# Patient Record
Sex: FEMALE | Race: WHITE | HISPANIC OR LATINO
[De-identification: names, ages, dates, MRNs, and addresses within clinical notes are randomized per-mention and may not be internally consistent; named-entity substitution may affect disease eponyms.]

---

## 2017-08-09 ENCOUNTER — TRANSCRIPTION ENCOUNTER (OUTPATIENT)
Age: 62
End: 2017-08-09

## 2018-11-29 ENCOUNTER — RESULT REVIEW (OUTPATIENT)
Age: 63
End: 2018-11-29

## 2021-01-08 ENCOUNTER — EMERGENCY (EMERGENCY)
Facility: HOSPITAL | Age: 66
LOS: 1 days | Discharge: HOME | End: 2021-01-08
Attending: EMERGENCY MEDICINE
Payer: COMMERCIAL

## 2021-01-08 VITALS
HEIGHT: 65 IN | TEMPERATURE: 98 F | SYSTOLIC BLOOD PRESSURE: 160 MMHG | RESPIRATION RATE: 18 BRPM | DIASTOLIC BLOOD PRESSURE: 92 MMHG | HEART RATE: 77 BPM | OXYGEN SATURATION: 97 % | WEIGHT: 184.97 LBS

## 2021-01-08 LAB
ALBUMIN SERPL ELPH-MCNC: 4.3 G/DL — SIGNIFICANT CHANGE UP (ref 3.3–5.2)
ALP SERPL-CCNC: 95 U/L — SIGNIFICANT CHANGE UP (ref 40–120)
ALT FLD-CCNC: 28 U/L — SIGNIFICANT CHANGE UP
ANION GAP SERPL CALC-SCNC: 11 MMOL/L — SIGNIFICANT CHANGE UP (ref 5–17)
APPEARANCE UR: CLEAR — SIGNIFICANT CHANGE UP
AST SERPL-CCNC: 24 U/L — SIGNIFICANT CHANGE UP
BACTERIA # UR AUTO: NEGATIVE — SIGNIFICANT CHANGE UP
BASOPHILS # BLD AUTO: 0.04 K/UL — SIGNIFICANT CHANGE UP (ref 0–0.2)
BASOPHILS NFR BLD AUTO: 0.6 % — SIGNIFICANT CHANGE UP (ref 0–2)
BILIRUB SERPL-MCNC: 0.5 MG/DL — SIGNIFICANT CHANGE UP (ref 0.4–2)
BILIRUB UR-MCNC: ABNORMAL
BUN SERPL-MCNC: 19 MG/DL — SIGNIFICANT CHANGE UP (ref 8–20)
CALCIUM SERPL-MCNC: 9.8 MG/DL — SIGNIFICANT CHANGE UP (ref 8.6–10.2)
CHLORIDE SERPL-SCNC: 100 MMOL/L — SIGNIFICANT CHANGE UP (ref 98–107)
CO2 SERPL-SCNC: 25 MMOL/L — SIGNIFICANT CHANGE UP (ref 22–29)
COLOR SPEC: YELLOW — SIGNIFICANT CHANGE UP
CREAT SERPL-MCNC: 0.6 MG/DL — SIGNIFICANT CHANGE UP (ref 0.5–1.3)
DIFF PNL FLD: ABNORMAL
EOSINOPHIL # BLD AUTO: 0.08 K/UL — SIGNIFICANT CHANGE UP (ref 0–0.5)
EOSINOPHIL NFR BLD AUTO: 1.3 % — SIGNIFICANT CHANGE UP (ref 0–6)
EPI CELLS # UR: SIGNIFICANT CHANGE UP
GLUCOSE SERPL-MCNC: 105 MG/DL — HIGH (ref 70–99)
GLUCOSE UR QL: NEGATIVE MG/DL — SIGNIFICANT CHANGE UP
HCT VFR BLD CALC: 43.9 % — SIGNIFICANT CHANGE UP (ref 34.5–45)
HGB BLD-MCNC: 14.2 G/DL — SIGNIFICANT CHANGE UP (ref 11.5–15.5)
IMM GRANULOCYTES NFR BLD AUTO: 0.2 % — SIGNIFICANT CHANGE UP (ref 0–1.5)
KETONES UR-MCNC: ABNORMAL
LEUKOCYTE ESTERASE UR-ACNC: ABNORMAL
LYMPHOCYTES # BLD AUTO: 1.99 K/UL — SIGNIFICANT CHANGE UP (ref 1–3.3)
LYMPHOCYTES # BLD AUTO: 31.6 % — SIGNIFICANT CHANGE UP (ref 13–44)
MCHC RBC-ENTMCNC: 29.3 PG — SIGNIFICANT CHANGE UP (ref 27–34)
MCHC RBC-ENTMCNC: 32.3 GM/DL — SIGNIFICANT CHANGE UP (ref 32–36)
MCV RBC AUTO: 90.5 FL — SIGNIFICANT CHANGE UP (ref 80–100)
MONOCYTES # BLD AUTO: 0.31 K/UL — SIGNIFICANT CHANGE UP (ref 0–0.9)
MONOCYTES NFR BLD AUTO: 4.9 % — SIGNIFICANT CHANGE UP (ref 2–14)
NEUTROPHILS # BLD AUTO: 3.86 K/UL — SIGNIFICANT CHANGE UP (ref 1.8–7.4)
NEUTROPHILS NFR BLD AUTO: 61.4 % — SIGNIFICANT CHANGE UP (ref 43–77)
NITRITE UR-MCNC: NEGATIVE — SIGNIFICANT CHANGE UP
PH UR: 5 — SIGNIFICANT CHANGE UP (ref 5–8)
PLATELET # BLD AUTO: 263 K/UL — SIGNIFICANT CHANGE UP (ref 150–400)
POTASSIUM SERPL-MCNC: 4.1 MMOL/L — SIGNIFICANT CHANGE UP (ref 3.5–5.3)
POTASSIUM SERPL-SCNC: 4.1 MMOL/L — SIGNIFICANT CHANGE UP (ref 3.5–5.3)
PROT SERPL-MCNC: 7.3 G/DL — SIGNIFICANT CHANGE UP (ref 6.6–8.7)
PROT UR-MCNC: 30 MG/DL
RBC # BLD: 4.85 M/UL — SIGNIFICANT CHANGE UP (ref 3.8–5.2)
RBC # FLD: 12.7 % — SIGNIFICANT CHANGE UP (ref 10.3–14.5)
RBC CASTS # UR COMP ASSIST: NEGATIVE /HPF — SIGNIFICANT CHANGE UP (ref 0–4)
SARS-COV-2 RNA SPEC QL NAA+PROBE: SIGNIFICANT CHANGE UP
SODIUM SERPL-SCNC: 136 MMOL/L — SIGNIFICANT CHANGE UP (ref 135–145)
SP GR SPEC: 1.02 — SIGNIFICANT CHANGE UP (ref 1.01–1.02)
TROPONIN T SERPL-MCNC: <0.01 NG/ML — SIGNIFICANT CHANGE UP (ref 0–0.06)
UROBILINOGEN FLD QL: 1 MG/DL
WBC # BLD: 6.29 K/UL — SIGNIFICANT CHANGE UP (ref 3.8–10.5)
WBC # FLD AUTO: 6.29 K/UL — SIGNIFICANT CHANGE UP (ref 3.8–10.5)
WBC UR QL: SIGNIFICANT CHANGE UP

## 2021-01-08 PROCEDURE — 70450 CT HEAD/BRAIN W/O DYE: CPT | Mod: 26

## 2021-01-08 PROCEDURE — 99284 EMERGENCY DEPT VISIT MOD MDM: CPT | Mod: 25

## 2021-01-08 PROCEDURE — 36415 COLL VENOUS BLD VENIPUNCTURE: CPT

## 2021-01-08 PROCEDURE — 71046 X-RAY EXAM CHEST 2 VIEWS: CPT | Mod: 26

## 2021-01-08 PROCEDURE — 84484 ASSAY OF TROPONIN QUANT: CPT

## 2021-01-08 PROCEDURE — 93010 ELECTROCARDIOGRAM REPORT: CPT

## 2021-01-08 PROCEDURE — 81001 URINALYSIS AUTO W/SCOPE: CPT

## 2021-01-08 PROCEDURE — 70450 CT HEAD/BRAIN W/O DYE: CPT

## 2021-01-08 PROCEDURE — U0003: CPT

## 2021-01-08 PROCEDURE — 71046 X-RAY EXAM CHEST 2 VIEWS: CPT

## 2021-01-08 PROCEDURE — 80053 COMPREHEN METABOLIC PANEL: CPT

## 2021-01-08 PROCEDURE — 93005 ELECTROCARDIOGRAM TRACING: CPT

## 2021-01-08 PROCEDURE — U0005: CPT

## 2021-01-08 PROCEDURE — 99284 EMERGENCY DEPT VISIT MOD MDM: CPT

## 2021-01-08 PROCEDURE — 85025 COMPLETE CBC W/AUTO DIFF WBC: CPT

## 2021-01-08 RX ORDER — CHLORTHALIDONE 50 MG
1 TABLET ORAL
Qty: 30 | Refills: 0
Start: 2021-01-08 | End: 2021-02-06

## 2021-01-08 RX ORDER — POTASSIUM CHLORIDE 20 MEQ
1 PACKET (EA) ORAL
Qty: 60 | Refills: 0
Start: 2021-01-08 | End: 2021-02-06

## 2021-01-08 NOTE — ED ADULT NURSE REASSESSMENT NOTE - NS ED NURSE REASSESS COMMENT FT1
IV removed, discharge instructions provided by MD. Vital Signs Stable. pt d/c in stable condition, no apparent distress noted at this time. pt A&Ox3. pt able to ambulate with steady gait. pt in no distress at d/c.

## 2021-01-08 NOTE — CONSULT NOTE ADULT - SUBJECTIVE AND OBJECTIVE BOX
[place] [facility]                                                               Office:  [address] [address]                                                              Telephone: [phone]. Fax:[phone]                                                                        CARDIOLOGY CONSULTATION NOTE                                                                                             Consult requested by:  EFRAIN Beltran  Reason for Consultation: Hypertension, Dizziness  History obtained by: Patient and medical record   obtained: No    Chief complaint:    Patient is a 65y old  Female who presents with a chief complaint of headache, dizziness, HTN    HPI:  66yo female w/PMH HTN, managed by PMD Dr. Simi Arellano.  Normally controlled on Benazapril 20mg BID and Coreg 3.125mg PO BID. For the last 2 days she has been having headaches and dizziness with -125.  Admits to having a poor diet and not controlling sodium intake. Denies chest pain/pressure, palpitations, irregular and/or rapid heart beat, SOB, QUINTEROS, syncope/near syncope, orthopnea, PND, cough, edema, f/c, n/v/d, hematuria, or hematochezia.     REVIEW OF SYMPTOMS:     CONSTITUTIONAL: No fever, weight loss, or fatigue  ENMT:  No difficulty hearing, tinnitus, vertigo; No sinus or throat pain  NECK: No pain or stiffness  CARDIOVASCULAR: as per HPI  RESPIRATORY: No Dyspnea on exertion, Shortness of breath, cough, wheezing  : No dysuria, no hematuria   GI: No dark color stool, no melena, no diarrhea, no constipation, no abdominal pain   NEURO: as per HPI   MUSCULOSKELETAL: No joint pain or swelling; No muscle, back, or extremity pain  PSYCH: No agitation, no anxiety.    ALL OTHER REVIEW OF SYSTEMS ARE NEGATIVE.      PREVIOUS DIAGNOSTIC TESTING  none documented      ALLERGIES: Allergies    No Known Allergies    Intolerances          PAST MEDICAL HISTORY  Borderline diabetic    Hyperlipidemia, unspecified hyperlipidemia type    Hypertension, unspecified type    No pertinent past medical history        PAST SURGICAL HISTORY  No significant past surgical history        FAMILY HISTORY:  Mom CABG x3 in 60s    SOCIAL HISTORY:    CIGARETTES: Denies  ALCOHOL: Denies  DRUGS: Denies      CURRENT MEDICATIONS:           HOME MEDICATIONS:  Benazapril 20mg PO BID  Coreg 3.125mg PO BID    Vital Signs Last 24 Hrs  T(C): 36.6 (2021 08:21), Max: 36.6 (2021 08:21)  T(F): 97.9 (2021 08:21), Max: 97.9 (2021 08:21)  HR: 77 (2021 08:21) (77 - 77)  BP: 160/92 (2021 08:21) (160/92 - 160/92)  BP(mean): --  RR: 18 (2021 08:21) (18 - 18)  SpO2: 97% (2021 08:21) (97% - 97%)      PHYSICAL EXAM:  Appearance: NAD, well nourished	  Neurologic: A&Ox3, PERRL, EOMI, Grossly non-focal with full strength in all four extremities  HEENT:   Normal oral mucosa, sclera non-icteric	  Lymphatic: No cervical lymphadenopathy  Cardiovascular: Normal S1 S2, No JVD, No cardiac murmurs, No carotid bruits  Respiratory: Lungs clear to auscultation	  Psychiatry: Mood & affect appropriate  Gastrointestinal:  Soft, Non-tender, + BS, no bruits	  Extremities: Normal range of motion, No clubbing, cyanosis or edema  Vascular: Peripheral pulses palpable 2+ bilaterally  Skin: No rashes, No Erythema, No ecchymoses, No cyanosis  Lines and Tubes: n/a    Intake and output:     LABS:                        14.2   6.29  )-----------( 263      ( 2021 09:34 )             43.9     01-08    136  |  100  |  19.0  ----------------------------<  105<H>  4.1   |  25.0  |  0.60    Ca    9.8      2021 09:34    TPro  7.3  /  Alb  4.3  /  TBili  0.5  /  DBili  x   /  AST  24  /  ALT  28  /  AlkPhos  95  01-08    CARDIAC MARKERS ( 2021 09:34 )  x     / <0.01 ng/mL / x     / x     / x        ;p-BNP=    Urinalysis Basic - ( 2021 09:25 )    Color: Yellow / Appearance: Clear / S.020 / pH: x  Gluc: x / Ketone: Trace  / Bili: Small / Urobili: 1 mg/dL   Blood: x / Protein: 30 mg/dL / Nitrite: Negative   Leuk Esterase: Small / RBC: Negative /HPF / WBC 3-5   Sq Epi: x / Non Sq Epi: Occasional / Bacteria: Negative        INTERPRETATION OF TELEMETRY: no CM  ECG: Reviewed by me.     RADIOLOGY & ADDITIONAL STUDIES:    X-ray:     CT scan:   < from: CT Head No Cont (21 @ 09:31) >  IMPRESSION:   Minimal anterior periventricular white matter ischemia.    < end of copied text >    MRI:

## 2021-01-08 NOTE — CONSULT NOTE ADULT - ASSESSMENT
64yo female w/PMH HTN, managed by PMD Dr. Simi Arellano.  Normally controlled on Benazapril 20mg BID and Coreg 3.125mg PO BID. For the last 2 days she has been having headaches and dizziness with -125.  Admits to having a poor diet and not controlling sodium intake.     HTN  -likely the cause of neurological symptoms, currently asymptomatic since BP better controlled  -CT head nml  -Trop neg x1  -EKG w/o ischemia  -c/w home meds  -add chlorthalidone 25mg PO QD  -add KCl 10mEq PO QD  -check BMP in 1wk  -follow strict low Na diet  -TTE and f/u in office w/Dr. Gamble in 2-3wks    Thank you for allowing me to participate in care of your patient.   Please call as needed.     Assessment and recommendations are final when note is signed by the attending.

## 2021-01-08 NOTE — ED STATDOCS - CLINICAL SUMMARY MEDICAL DECISION MAKING FREE TEXT BOX
Labile bp and vertigo. Will obtain labs, CT, CXR and cardiology consult Labile bp and vertigo with normal exam. Will obtain labs, CT, CXR and cardiology consult

## 2021-01-08 NOTE — ED STATDOCS - NS_ ATTENDINGSCRIBEDETAILS _ED_A_ED_FT
I, Clark Snell, performed the initial face to face bedside interview with this patient regarding history of present illness, review of symptoms and relevant past medical, social and family history.  I completed an independent physical examination.  I was the provider who initially evaluated this patient.  The history, relevant review of systems, past medical and surgical history, medical decision making, and physical examination was documented by the scribe in my presence and I attest to the accuracy of the documentation. Follow-up on ordered tests (ie labs, radiologic studies) and re-evaluation of the patient's status has been communicated to the ACP.  Disposition of the patient will be based on test outcome and response to ED interventions.

## 2021-01-08 NOTE — ED ADULT NURSE NOTE - OBJECTIVE STATEMENT
65y female AOx4 c/o "room spinning" dizziness, lightheaded, headache, and nausea since this AM @ 0700. MD Snell at bedside for eval. respirations even and unlabored, denies chest discomfort or SOB. no facial droop or slurred speech noted. pt moving all extremities without difficulty, strength and sensation WNL and equal in all extremities. abd soft and nondistended. skin WNL for race.

## 2021-01-08 NOTE — ED STATDOCS - CARE PROVIDER_API CALL
Danita Gamble V  CARDIOLOGY  [address] [place] [address]  Phone: [phone]  Fax: [phone]  Follow Up Time: 7-10 Days

## 2021-01-08 NOTE — ED ADULT NURSE NOTE - NSIMPLEMENTINTERV_GEN_ALL_ED
Implemented All Universal Safety Interventions:  [place] to call system. Call bell, personal items and telephone within reach. Instruct patient to call for assistance. Room bathroom lighting operational. Non-slip footwear when patient is off stretcher. Physically safe environment: no spills, clutter or unnecessary equipment. Stretcher in lowest position, wheels locked, appropriate side rails in place.

## 2021-01-08 NOTE — ED STATDOCS - PATIENT PORTAL LINK FT
You can access the FollowMyHealth Patient Portal offered by [facility] by registering at the following website: [email or web address]. By joining [facility]’s FollowMyHealth portal, you will also be able to view your health information using other applications (apps) compatible with our system.

## 2021-01-08 NOTE — ED STATDOCS - PMH
Borderline diabetic    Hyperlipidemia, unspecified hyperlipidemia type    Hypertension, unspecified type

## 2021-01-08 NOTE — ED STATDOCS - NSFOLLOWUPINSTRUCTIONS_ED_ALL_ED_FT
continue your medications as previously prescribed.  Take chlorthalidone 25mg daily and potassium chloride 10meq daily.  Follow-up with cardiology in 2 weeks for re-evaluation and repeat echo.  LOW SODIUM DIET! follow-up with your doctor in 1 week for repeat BMP. Return immediately to the ER for re-evaluation if your symptoms recur or worsening.

## 2021-01-08 NOTE — ED ADULT TRIAGE NOTE - CHIEF COMPLAINT QUOTE
headache with nausea starting 0700 states she feels light headed and dizzy when she stands. pt ambulatory MARY

## 2021-01-08 NOTE — ED STATDOCS - OBJECTIVE STATEMENT
66 y/o F with PMHx of HTN presents to ED c/o high bp for the past few days with intermittent dizziness and ha onset Wednesday. Pt states dizziness feels like the world is spinning and she is off balance. Pt states the dizziness lasts about an hour and occurs at least 2x a day. Pt denies slurred speech, numbness of face, or weakness of arms and legs. Pt states today at 7 AM her bp was 160/115. Pt took 2 Tylenols and then took bp again and it was 180/125. Pt takes benazapril 20mg 2x a day with no drop in bp so she came here. Pt used to see a Cardiologist but doesn't currently. Denies cp, sob 66 y/o F with PMHx of HTN presents to ED c/o high bp for the past few days with intermittent dizziness and ha onset Wednesday. Pt states dizziness feels like the world is spinning and she is off balance. Pt states the dizziness lasts about an hour and occurs at least 2x a day. Pt denies slurred speech, numbness of face, or weakness of arms and legs. Reports a tickling sensation of the lefthand corner of the lip.  Pt states today at 7 AM her bp was 160/115. Pt took 2 Tylenols and then took bp again and it was 180/125. Pt takes benazapril 20mg 2x a day with no drop in bp so she came here. Pt used to see a Cardiologist but doesn't currently. Denies cp, sob

## 2021-01-12 PROBLEM — R73.03 PREDIABETES: Chronic | Status: ACTIVE | Noted: 2021-01-08

## 2021-01-12 PROBLEM — I10 ESSENTIAL (PRIMARY) HYPERTENSION: Chronic | Status: ACTIVE | Noted: 2021-01-08

## 2021-01-12 PROBLEM — E78.5 HYPERLIPIDEMIA, UNSPECIFIED: Chronic | Status: ACTIVE | Noted: 2021-01-08

## 2021-01-19 ENCOUNTER — APPOINTMENT (OUTPATIENT)
Dept: CARDIOLOGY | Facility: CLINIC | Age: 66
End: 2021-01-19
Payer: COMMERCIAL

## 2021-01-19 ENCOUNTER — NON-APPOINTMENT (OUTPATIENT)
Age: 66
End: 2021-01-19

## 2021-01-19 VITALS — TEMPERATURE: 98.4 F | HEIGHT: 66 IN | BODY MASS INDEX: 30.37 KG/M2 | WEIGHT: 189 LBS

## 2021-01-19 VITALS — SYSTOLIC BLOOD PRESSURE: 96 MMHG | HEART RATE: 71 BPM | OXYGEN SATURATION: 95 % | DIASTOLIC BLOOD PRESSURE: 68 MMHG

## 2021-01-19 VITALS — SYSTOLIC BLOOD PRESSURE: 104 MMHG | DIASTOLIC BLOOD PRESSURE: 78 MMHG

## 2021-01-19 DIAGNOSIS — Z82.49 FAMILY HISTORY OF ISCHEMIC HEART DISEASE AND OTHER DISEASES OF THE CIRCULATORY SYSTEM: ICD-10-CM

## 2021-01-19 PROCEDURE — 93000 ELECTROCARDIOGRAM COMPLETE: CPT

## 2021-01-19 PROCEDURE — 99072 ADDL SUPL MATRL&STAF TM PHE: CPT

## 2021-01-19 PROCEDURE — 99213 OFFICE O/P EST LOW 20 MIN: CPT

## 2021-01-19 NOTE — PHYSICAL EXAM
[General Appearance - Well Developed] : well developed [General Appearance - Well Nourished] : well nourished [Normal Conjunctiva] : the conjunctiva exhibited no abnormalities [FreeTextEntry1] : No JVD [Heart Sounds] : normal S1 and S2 [Murmurs] : no murmurs present [Respiration, Rhythm And Depth] : normal respiratory rhythm and effort [Auscultation Breath Sounds / Voice Sounds] : lungs were clear to auscultation bilaterally [Abdomen Soft] : soft [Abnormal Walk] : normal gait [Cyanosis, Localized] : no localized cyanosis [] : no rash

## 2021-01-19 NOTE — ASSESSMENT
[FreeTextEntry1] : Head CT scan January 8, 2021: MPRESSION:   Minimal anterior periventricular white matter ischemia.\par \par \par \par EKG 1/19/2021-normal sinus rhythm normal ECG\par \par Assessment:\par 1. HTN\par \par Recommendations:\par - Management of medications adjustment to be done by her PCP. \par -I have advised the patient to contact her PCP regarding low blood pressure readings.\par Echocardiogram\par Follow-up in 2 to 3 months\par

## 2021-01-19 NOTE — HISTORY OF PRESENT ILLNESS
[FreeTextEntry1] : Patients [facility] records reviewed and summarized as follows.\par \par \par Patient is 64 yo  female w/PMH HTN, managed by PMD Dr. Simi Arellano.  Patient is a retired pharmacist.  Patient was seen at [facility] on January 8, 2021 for elevated blood pressure with headache, patient admits to having a poor diet and not controlling sodium intake.  Patient was discharged home on chlorthalidone 25 mg daily.  \par \par Since hospital discharge patient has had a follow-up with Dr. Arellano who has made some changes to her antihypertensive medications.\par \par Patient  presents for a follow-up visit,is feeling better.  Patient has noted very low blood pressure.  Patient denies any exertional chest pain dyspnea palpitations.\par \par

## 2021-01-25 ENCOUNTER — APPOINTMENT (OUTPATIENT)
Dept: CARDIOLOGY | Facility: CLINIC | Age: 66
End: 2021-01-25
Payer: COMMERCIAL

## 2021-01-25 PROCEDURE — 93306 TTE W/DOPPLER COMPLETE: CPT

## 2021-01-25 PROCEDURE — 99072 ADDL SUPL MATRL&STAF TM PHE: CPT

## 2021-02-04 ENCOUNTER — APPOINTMENT (OUTPATIENT)
Dept: CARDIOLOGY | Facility: CLINIC | Age: 66
End: 2021-02-04
Payer: COMMERCIAL

## 2021-02-04 VITALS
WEIGHT: 188.9 LBS | OXYGEN SATURATION: 94 % | HEART RATE: 59 BPM | BODY MASS INDEX: 31.47 KG/M2 | SYSTOLIC BLOOD PRESSURE: 128 MMHG | DIASTOLIC BLOOD PRESSURE: 78 MMHG | TEMPERATURE: 98.4 F | HEIGHT: 65 IN

## 2021-02-04 PROCEDURE — 99072 ADDL SUPL MATRL&STAF TM PHE: CPT

## 2021-02-04 PROCEDURE — 99213 OFFICE O/P EST LOW 20 MIN: CPT

## 2021-02-04 NOTE — PHYSICAL EXAM
[General Appearance - Well Developed] : well developed [Normal Appearance] : normal appearance [Well Groomed] : well groomed [General Appearance - Well Nourished] : well nourished [No Deformities] : no deformities [General Appearance - In No Acute Distress] : no acute distress [Normal Conjunctiva] : the conjunctiva exhibited no abnormalities [Eyelids - No Xanthelasma] : the eyelids demonstrated no xanthelasmas [Normal Oral Mucosa] : normal oral mucosa [No Oral Pallor] : no oral pallor [No Oral Cyanosis] : no oral cyanosis [Normal Jugular Venous A Waves Present] : normal jugular venous A waves present [Normal Jugular Venous V Waves Present] : normal jugular venous V waves present [No Jugular Venous Branch A Waves] : no jugular venous branch A waves [Respiration, Rhythm And Depth] : normal respiratory rhythm and effort [Exaggerated Use Of Accessory Muscles For Inspiration] : no accessory muscle use [Auscultation Breath Sounds / Voice Sounds] : lungs were clear to auscultation bilaterally [Heart Rate And Rhythm] : heart rate and rhythm were normal [Heart Sounds] : normal S1 and S2 [Murmurs] : no murmurs present [Abdomen Soft] : soft [Abdomen Tenderness] : non-tender [Abdomen Mass (___ Cm)] : no abdominal mass palpated [Abnormal Walk] : normal gait [Gait - Sufficient For Exercise Testing] : the gait was sufficient for exercise testing [Nail Clubbing] : no clubbing of the fingernails [Cyanosis, Localized] : no localized cyanosis [Petechial Hemorrhages (___cm)] : no petechial hemorrhages [Skin Color & Pigmentation] : normal skin color and pigmentation [] : no rash [No Venous Stasis] : no venous stasis [Skin Lesions] : no skin lesions [No Skin Ulcers] : no skin ulcer [No Xanthoma] : no  xanthoma was observed [Oriented To Time, Place, And Person] : oriented to person, place, and time [Affect] : the affect was normal [Mood] : the mood was normal [No Anxiety] : not feeling anxious

## 2021-02-04 NOTE — HISTORY OF PRESENT ILLNESS
[FreeTextEntry1] : 64 y/o HF with PMH HTN presents for follow up visit. Patient was previously seen by Dr. Gamble on 1/19/21. Patient states that her blood pressure is frequently uncontrolled, with systolic ranging from 130-140 and diastolic >90. She states that when her BP is uncontrolled, she experiences headaches. Denies CP, SOB, palpitations, dizziness/lightheadedness, syncope/near-syncope, LE swelling, orthopnea, or PND. \par \par \par EKG (1/19/21): SR, normal axis, no ST-T changes\par \par Echo (1/25/21): \par LVEF 60-65%, normal LV function, normal RV size/function, mild pulmonary HTN\par \par

## 2021-02-05 RX ORDER — AMLODIPINE BESYLATE AND BENAZEPRIL HYDROCHLORIDE 5; 20 MG/1; MG/1
5-20 CAPSULE ORAL
Refills: 0 | Status: DISCONTINUED | COMMUNITY
Start: 2021-01-19 | End: 2021-02-05

## 2021-04-15 ENCOUNTER — EMERGENCY (EMERGENCY)
Facility: HOSPITAL | Age: 66
LOS: 1 days | Discharge: HOME | End: 2021-04-15
Attending: EMERGENCY MEDICINE
Payer: COMMERCIAL

## 2021-04-15 VITALS
SYSTOLIC BLOOD PRESSURE: 132 MMHG | OXYGEN SATURATION: 98 % | DIASTOLIC BLOOD PRESSURE: 61 MMHG | HEART RATE: 60 BPM | RESPIRATION RATE: 16 BRPM

## 2021-04-15 VITALS
SYSTOLIC BLOOD PRESSURE: 164 MMHG | HEART RATE: 79 BPM | HEIGHT: 65 IN | OXYGEN SATURATION: 96 % | DIASTOLIC BLOOD PRESSURE: 89 MMHG | WEIGHT: 182.1 LBS | RESPIRATION RATE: 20 BRPM | TEMPERATURE: 98 F

## 2021-04-15 LAB
ALBUMIN SERPL ELPH-MCNC: 4.8 G/DL — SIGNIFICANT CHANGE UP (ref 3.3–5.2)
ALP SERPL-CCNC: 95 U/L — SIGNIFICANT CHANGE UP (ref 40–120)
ALT FLD-CCNC: 28 U/L — SIGNIFICANT CHANGE UP
ANION GAP SERPL CALC-SCNC: 9 MMOL/L — SIGNIFICANT CHANGE UP (ref 5–17)
APTT BLD: 33.4 SEC — SIGNIFICANT CHANGE UP (ref 27.5–35.5)
AST SERPL-CCNC: 25 U/L — SIGNIFICANT CHANGE UP
BASOPHILS # BLD AUTO: 0.03 K/UL — SIGNIFICANT CHANGE UP (ref 0–0.2)
BASOPHILS NFR BLD AUTO: 0.4 % — SIGNIFICANT CHANGE UP (ref 0–2)
BILIRUB SERPL-MCNC: 0.6 MG/DL — SIGNIFICANT CHANGE UP (ref 0.4–2)
BLD GP AB SCN SERPL QL: SIGNIFICANT CHANGE UP
BUN SERPL-MCNC: 17 MG/DL — SIGNIFICANT CHANGE UP (ref 8–20)
CALCIUM SERPL-MCNC: 9.8 MG/DL — SIGNIFICANT CHANGE UP (ref 8.6–10.2)
CHLORIDE SERPL-SCNC: 96 MMOL/L — LOW (ref 98–107)
CO2 SERPL-SCNC: 30 MMOL/L — HIGH (ref 22–29)
CREAT SERPL-MCNC: 0.61 MG/DL — SIGNIFICANT CHANGE UP (ref 0.5–1.3)
EOSINOPHIL # BLD AUTO: 0.07 K/UL — SIGNIFICANT CHANGE UP (ref 0–0.5)
EOSINOPHIL NFR BLD AUTO: 0.9 % — SIGNIFICANT CHANGE UP (ref 0–6)
GLUCOSE SERPL-MCNC: 120 MG/DL — HIGH (ref 70–99)
HCT VFR BLD CALC: 42.8 % — SIGNIFICANT CHANGE UP (ref 34.5–45)
HGB BLD-MCNC: 13.9 G/DL — SIGNIFICANT CHANGE UP (ref 11.5–15.5)
IMM GRANULOCYTES NFR BLD AUTO: 0.3 % — SIGNIFICANT CHANGE UP (ref 0–1.5)
INR BLD: 1.15 RATIO — SIGNIFICANT CHANGE UP (ref 0.88–1.16)
LYMPHOCYTES # BLD AUTO: 2.21 K/UL — SIGNIFICANT CHANGE UP (ref 1–3.3)
LYMPHOCYTES # BLD AUTO: 29.2 % — SIGNIFICANT CHANGE UP (ref 13–44)
MCHC RBC-ENTMCNC: 29.4 PG — SIGNIFICANT CHANGE UP (ref 27–34)
MCHC RBC-ENTMCNC: 32.5 GM/DL — SIGNIFICANT CHANGE UP (ref 32–36)
MCV RBC AUTO: 90.5 FL — SIGNIFICANT CHANGE UP (ref 80–100)
MONOCYTES # BLD AUTO: 0.42 K/UL — SIGNIFICANT CHANGE UP (ref 0–0.9)
MONOCYTES NFR BLD AUTO: 5.5 % — SIGNIFICANT CHANGE UP (ref 2–14)
NEUTROPHILS # BLD AUTO: 4.83 K/UL — SIGNIFICANT CHANGE UP (ref 1.8–7.4)
NEUTROPHILS NFR BLD AUTO: 63.7 % — SIGNIFICANT CHANGE UP (ref 43–77)
PLATELET # BLD AUTO: 312 K/UL — SIGNIFICANT CHANGE UP (ref 150–400)
POTASSIUM SERPL-MCNC: 3.6 MMOL/L — SIGNIFICANT CHANGE UP (ref 3.5–5.3)
POTASSIUM SERPL-SCNC: 3.6 MMOL/L — SIGNIFICANT CHANGE UP (ref 3.5–5.3)
PROT SERPL-MCNC: 8 G/DL — SIGNIFICANT CHANGE UP (ref 6.6–8.7)
PROTHROM AB SERPL-ACNC: 13.3 SEC — SIGNIFICANT CHANGE UP (ref 10.6–13.6)
RBC # BLD: 4.73 M/UL — SIGNIFICANT CHANGE UP (ref 3.8–5.2)
RBC # FLD: 13 % — SIGNIFICANT CHANGE UP (ref 10.3–14.5)
SODIUM SERPL-SCNC: 135 MMOL/L — SIGNIFICANT CHANGE UP (ref 135–145)
TROPONIN T SERPL-MCNC: <0.01 NG/ML — SIGNIFICANT CHANGE UP (ref 0–0.06)
WBC # BLD: 7.58 K/UL — SIGNIFICANT CHANGE UP (ref 3.8–10.5)
WBC # FLD AUTO: 7.58 K/UL — SIGNIFICANT CHANGE UP (ref 3.8–10.5)

## 2021-04-15 PROCEDURE — 80053 COMPREHEN METABOLIC PANEL: CPT

## 2021-04-15 PROCEDURE — 99285 EMERGENCY DEPT VISIT HI MDM: CPT

## 2021-04-15 PROCEDURE — 70551 MRI BRAIN STEM W/O DYE: CPT | Mod: 26,MA

## 2021-04-15 PROCEDURE — 86900 BLOOD TYPING SEROLOGIC ABO: CPT

## 2021-04-15 PROCEDURE — 85610 PROTHROMBIN TIME: CPT

## 2021-04-15 PROCEDURE — 93010 ELECTROCARDIOGRAM REPORT: CPT | Mod: 76

## 2021-04-15 PROCEDURE — 99291 CRITICAL CARE FIRST HOUR: CPT | Mod: 25

## 2021-04-15 PROCEDURE — 85025 COMPLETE CBC W/AUTO DIFF WBC: CPT

## 2021-04-15 PROCEDURE — 84484 ASSAY OF TROPONIN QUANT: CPT

## 2021-04-15 PROCEDURE — 86850 RBC ANTIBODY SCREEN: CPT

## 2021-04-15 PROCEDURE — 93005 ELECTROCARDIOGRAM TRACING: CPT

## 2021-04-15 PROCEDURE — 85730 THROMBOPLASTIN TIME PARTIAL: CPT

## 2021-04-15 PROCEDURE — 86901 BLOOD TYPING SEROLOGIC RH(D): CPT

## 2021-04-15 PROCEDURE — 82962 GLUCOSE BLOOD TEST: CPT

## 2021-04-15 PROCEDURE — 70551 MRI BRAIN STEM W/O DYE: CPT

## 2021-04-15 PROCEDURE — 36415 COLL VENOUS BLD VENIPUNCTURE: CPT

## 2021-04-15 PROCEDURE — 70496 CT ANGIOGRAPHY HEAD: CPT

## 2021-04-15 PROCEDURE — 70498 CT ANGIOGRAPHY NECK: CPT | Mod: 26,MA

## 2021-04-15 PROCEDURE — 70450 CT HEAD/BRAIN W/O DYE: CPT

## 2021-04-15 PROCEDURE — 70496 CT ANGIOGRAPHY HEAD: CPT | Mod: 26,MA

## 2021-04-15 PROCEDURE — 0042T: CPT

## 2021-04-15 PROCEDURE — 84443 ASSAY THYROID STIM HORMONE: CPT

## 2021-04-15 PROCEDURE — 70498 CT ANGIOGRAPHY NECK: CPT

## 2021-04-15 RX ORDER — IRBESARTAN 75 MG/1
1 TABLET ORAL
Qty: 0 | Refills: 0 | DISCHARGE

## 2021-04-15 RX ORDER — CARVEDILOL PHOSPHATE 80 MG/1
1 CAPSULE, EXTENDED RELEASE ORAL
Qty: 0 | Refills: 0 | DISCHARGE

## 2021-04-15 NOTE — ED PROVIDER NOTE - CLINICAL SUMMARY MEDICAL DECISION MAKING FREE TEXT BOX
code stroke AH and dizzy  seen at 0906 no acute finding  but stroke protocol followed   pe as documented  DR Price radiology called all studies negative ct cta head and neck and perfusion  will call cardio consult fo BP mngt and MR ordered

## 2021-04-15 NOTE — ED PROVIDER NOTE - ATTENDING CONTRIBUTION TO CARE
code stroke pt seen with resident  hx ha and mild dizzy  no stroke findings  stat ct cta perfusion iv labs ekg cxr  then mr and cardio consult for bp mngt

## 2021-04-15 NOTE — CONSULT NOTE ADULT - ATTENDING COMMENTS
Pt is seen, examined, chart reviewed, d/w np/pa.  Management as outlined above.  Headache: CT/CTA Head and Neck-NAP,  2.2 cm right thyroid nodule.  No large vessel occlusion. MR Head-pending

## 2021-04-15 NOTE — ED PROVIDER NOTE - PROGRESS NOTE DETAILS
Kvng PGY-2: Pt reassessed, pt feeling better at this time, vss, pt able to walk, talk and vocalized plan of action. Discussed in depth and explained to pt in depth the next steps that need to be taking including proper follow up with PCP or specialists. All incidental findings were discussed with pt as well. Pt verbalized their concerns and all questions were answered. Pt understands dispo and wants discharge. Given good instructions when to return to ED and importance of f/u.

## 2021-04-15 NOTE — ED PROVIDER NOTE - CARE PLAN
Principal Discharge DX:	Acute non intractable tension-type headache  Secondary Diagnosis:	Hypertension, unspecified type

## 2021-04-15 NOTE — ED PROVIDER NOTE - CARE PROVIDER_API CALL
Elias Carpio; PhD)  Neurology; Vascular Neurology  [address] [place] [address]  Phone: [phone]  Fax: [phone]  Follow Up Time:

## 2021-04-15 NOTE — ED ADULT NURSE NOTE - OBJECTIVE STATEMENT
65 F, nad, alert and oriented x 3 c/o elevated /113 at 0730 this morning with headache and dizziness this morning, reports OWENS started last night. Pt reports she took her morning dose of coreg and came here, reports was here in January for same symptoms. Dr Calderon now in room examining patient and assessment deferred to same, Fall precautions, cardiac and Spo2 monitoring in place.

## 2021-04-15 NOTE — ED PROVIDER NOTE - OBJECTIVE STATEMENT
SEEN AT 0906 AM  66 yo and female with c/c headache and dizziness. Had HA last night but was able to fall asleep approximately 10 PM. This am approx 5/5:30 am she had L sided headache and dizziness. Her BP was 157/113 on her BP machine. She took COREG 1/2 hour earlier than usual and came to the hospital. In ER she gives me above hx and tells me that her BP has not been well controlled x 2 months. She is under the care of Dr Simi Arellano.  No cigs No alcohol   Med hx Htn hld borderline DM

## 2021-04-15 NOTE — STROKE CODE NOTE - NIH STROKE SCALE: 8. SENSORY, QM
[facility] Progress Note    Date: 2019    Name: Rolan Aden    MRN: [id number]         : 1970      Ms. Ozuna was assessed and education was provided. Ms. Ozuna's vitals were reviewed and patient was observed for 5 minutes prior to treatment. Visit Vitals  /89 (BP 1 Location: Right arm, BP Patient Position: At rest;Sitting)   Pulse (!) 58   Temp 98.3 °F (36.8 °C)   Resp 16   Breastfeeding? No       Lab results were obtained and reviewed. Recent Results (from the past 12 hour(s))   CBC WITH 3 PART DIFF    Collection Time: 19  9:57 AM   Result Value Ref Range    WBC 7.8 4.5 - 13.0 K/uL    RBC 4.98 4.10 - 5.10 M/uL    HGB 14.2 12.0 - 16.0 g/dL    HCT 44.3 36 - 48 %    MCV 89.0 78 - 102 FL    MCH 28.5 25.0 - 35.0 PG    MCHC 32.1 31 - 37 g/dL    RDW 15.1 (H) 11.5 - 14.5 %    PLATELET 185 232 - 956 K/uL    NEUTROPHILS 55 40 - 70 %    MIXED CELLS 8 0.1 - 17 %    LYMPHOCYTES 37 14 - 44 %    ABS. NEUTROPHILS 4.3 1.8 - 9.5 K/UL    ABS. MIXED CELLS 0.6 0.0 - 2.3 K/uL    ABS. LYMPHOCYTES 2.9 1.1 - 5.9 K/UL    DF AUTOMATED         Therapeutic phlebotomy started at 1010 and stopped at 1040. Approx 500 ml blood removed from patient. 500 ml NS IV given to patient. Ms. Micheal Chapa tolerated the infusion, and had no complaints. Patient armband removed and shredded. Ms. Micheal Chapa was discharged from [place] [address] in stable condition at 1115. She is to return on 10/4/19 at 1300 for her next appointment for CBC/Phlebotomy, weekly status. This appt is pushed into future 1 week due to availability.     Lulu Main RN  2019  12:04 PM (0) Normal; no sensory loss

## 2021-04-15 NOTE — ED ADULT NURSE NOTE - CHIEF COMPLAINT QUOTE
Patient reports high blood pressure with headache that started last night at 10pm, states now headache is getting worse. Code stroke called.

## 2021-04-15 NOTE — ED ADULT TRIAGE NOTE - CHIEF COMPLAINT QUOTE
Patient reports high blood pressure with headache that started last night at 10pm, states now headache is getting worse. Patient reports high blood pressure with headache that started last night at 10pm, states now headache is getting worse. Code stroke called.

## 2021-04-15 NOTE — CONSULT NOTE ADULT - SUBJECTIVE AND OBJECTIVE BOX
[place] [facility]ARDIOLOGY-[facility]                                                               Office:  [address] [address]                                                              Telephone: [phone]. Fax:[phone]                                                                        CARDIOLOGY CONSULTATION NOTE                                                                                             Consult requested by:  Dr. Bowen  Reason for Consultation: Headache, Elevated BP, Dizziness  History obtained by: Patient and medical record   obtained: No    Chief complaint:    Patient is a 65y old  Female who presents with a chief complaint of Headache, Elevated BP, Dizziness      HPI: Pt is a 66 y/o female with medical history of HTN who presents to [facility]-ED for Headache, Elated BP, Dizziness. Pt states that since January her BP has been variable and her PCP has placed her on different medications to control it. Last night, patient complained of HA so she went to bed. However this morning, patient woke up and still had a HA and associated dizziness. When she checked her BP it was 157/115 so patient came to ED. In ED, ECG-NSR HR @ 53, T wave abnormalities throughout, Trop#1-negative, CT/CTA Head and Neck-No evidence of intracranial hemorrhage or mass effect. If clinical suspicion for acute infarct persists, brain MRI can be obtained if there is no contraindication/ No hemodynamically significant stenosis of the bilateral cervical ICAs using NASCET criteria.  Patent vertebral arteries.  No evidence of vascular dissection. 2.2 cm right thyroid nodule.  No large vessel occlusion. No evidence of aneurysm.       REVIEW OF SYMPTOMS:     CONSTITUTIONAL: No fever, weight loss, or fatigue  ENMT:  No difficulty hearing, tinnitus, vertigo; No sinus or throat pain  NECK: No pain or stiffness  CARDIOVASCULAR: See HPI  RESPIRATORY: No Dyspnea on exertion, Shortness of breath, cough, wheezing  : No dysuria, no hematuria   GI: No dark color stool, no melena, no diarrhea, no constipation, no abdominal pain   NEURO: No headache, no dizziness, no slurred speech   MUSCULOSKELETAL: No joint pain or swelling; No muscle, back, or extremity pain  PSYCH: No agitation, no anxiety.    ALL OTHER REVIEW OF SYSTEMS ARE NEGATIVE.      PREVIOUS DIAGNOSTIC TESTING    ECHO FINDINGS: 1/2021- EF 60-65%, normal RV size and fx., mild mitral annular calcification, PASP 40.9 mmHg-mild pulm HTN    ALLERGIES: Allergies    No Known Allergies    Intolerances      PAST MEDICAL HISTORY  No pertinent past medical history    Hypertension, unspecified type    Hyperlipidemia, unspecified hyperlipidemia type    Borderline diabetic        PAST SURGICAL HISTORY  No significant past surgical history        FAMILY HISTORY:      SOCIAL HISTORY:  Denies smoking/alcohol/drugs        CURRENT MEDICATIONS:           HOME MEDICATIONS:  Chlorathalidone 25mg  Lipitor 10mg  Avapro 150 mg oral tablet: 1 tab(s) orally once a day (15 Apr 2021 09:42)  Coreg 6.25 mg oral tablet: 1 tab(s) orally 2 times a day (15 Apr 2021 09:42)      Vital Signs Last 24 Hrs  T(C): 36.6 (15 Apr 2021 08:58), Max: 36.6 (15 Apr 2021 08:58)  T(F): 97.9 (15 Apr 2021 08:58), Max: 97.9 (15 Apr 2021 08:58)  HR: 64 (15 Apr 2021 12:45) (63 - 80)  BP: 141/67 (15 Apr 2021 12:45) (134/78 - 164/89)  RR: 12 (15 Apr 2021 12:45) (12 - 20)  SpO2: 98% (15 Apr 2021 12:45) (95% - 99%)      PHYSICAL EXAM:  Constitutional: Comfortable . No acute distress.   HEENT: Atraumatic and normocephalic , neck is supple . no JVD. No carotid bruit. PEERL   CNS: A&Ox3. No focal deficits. EOMI.   Lymph Nodes: Cervical : Not palpable.  Respiratory: CTAB  Cardiovascular: S1S2 RRR. No murmur/rubs or gallop.  Gastrointestinal: Soft non-tender and non distended . +Bowel sounds. negative Mitchell's sign.  Extremities: No edema.   Psychiatric: Calm . no agitation.  Skin: No skin rash/ulcers visualized to face, hands or feet.    Intake and output:     LABS:                        13.9   7.58  )-----------( 312      ( 15 Apr 2021 09:18 )             42.8     04-15    135  |  96<L>  |  17.0  ----------------------------<  120<H>  3.6   |  30.0<H>  |  0.61    Ca    9.8      15 Apr 2021 09:18    TPro  8.0  /  Alb  4.8  /  TBili  0.6  /  DBili  x   /  AST  25  /  ALT  28  /  AlkPhos  95  04-15    CARDIAC MARKERS ( 15 Apr 2021 09:18 )  x     / <0.01 ng/mL / x     / x     / x        ;p-BNP=  PT/INR - ( 15 Apr 2021 09:18 )   PT: 13.3 sec;   INR: 1.15 ratio         PTT - ( 15 Apr 2021 09:18 )  PTT:33.4 sec      INTERPRETATION OF TELEMETRY: SB HR @ 50s  ECG:  NSR HR @ 53, T wave abnormalities throughout    RADIOLOGY & ADDITIONAL STUDIES:    X-ray:  < from: Xray Chest 2 Views PA/Lat (01.08.21 @ 09:22) >  FINDINGS:    Frontal and lateral views of the chest demonstrates 5 mm right hilar pulmonary granuloma. The cardiomediastinal silhouette is normal. No acute osseous abnormalities. No pneumonia or CHF.    IMPRESSION:    No acute cardiopulmonary disease process.        CT scan: < from: CT Brain Stroke Protocol (04.15.21 @ 09:19) >  IMPRESSION:    Brain CT without contrast:  No evidence of intracranial hemorrhage or mass effect. If clinical suspicion for acute infarct persists, brain MRI can be obtained if there is no contraindication.    CT perfusion:  No evidence of core infarct.    CT angiography neck:  1.  No hemodynamically significant stenosis of the bilateral cervical ICAs using NASCET criteria.  Patent vertebral arteries.  No evidence of vascular dissection.  2.  2.2 cm right thyroid nodule. Recommend nonemergent thyroid ultrasound.    CT angiography brain: No large vessel occlusion. No evidence of aneurysm.        MRI: pending                                                                         [place] [facility]                                                               Office:  [address] [address]                                                              Telephone: [phone]. Fax:[phone]                                                                        CARDIOLOGY CONSULTATION NOTE                                                                                             Consult requested by:  Dr. Bowen  Reason for Consultation: Headache, Elevated BP, Dizziness  History obtained by: Patient and medical record   obtained: No    Chief complaint:    Patient is a 65y old  Female who presents with a chief complaint of Headache, Elevated BP, Dizziness      HPI: Pt is a 66 y/o female with medical history of HTN who presents to [facility]ED for Headache, Elated BP, Dizziness. Pt states that since January her BP has been variable and her PCP has placed her on different medications to control it. Last night, patient complained of HA so she went to bed. However this morning, patient woke up and still had a HA and associated dizziness. When she checked her BP it was 157/115 so patient came to ED. In ED, ECG-NSR HR @ 53, T wave abnormalities throughout, Trop#1-negative, CT/CTA Head and Neck-No evidence of intracranial hemorrhage or mass effect. If clinical suspicion for acute infarct persists, brain MRI can be obtained if there is no contraindication/ No hemodynamically significant stenosis of the bilateral cervical ICAs using NASCET criteria.  Patent vertebral arteries.  No evidence of vascular dissection. 2.2 cm right thyroid nodule.  No large vessel occlusion. No evidence of aneurysm.       REVIEW OF SYMPTOMS:     CONSTITUTIONAL: No fever, weight loss, or fatigue  ENMT:  No difficulty hearing, tinnitus, vertigo; No sinus or throat pain  NECK: No pain or stiffness  CARDIOVASCULAR: See HPI  RESPIRATORY: No Dyspnea on exertion, Shortness of breath, cough, wheezing  : No dysuria, no hematuria   GI: No dark color stool, no melena, no diarrhea, no constipation, no abdominal pain   NEURO: No headache, no dizziness, no slurred speech   MUSCULOSKELETAL: No joint pain or swelling; No muscle, back, or extremity pain  PSYCH: No agitation, no anxiety.    ALL OTHER REVIEW OF SYSTEMS ARE NEGATIVE.      PREVIOUS DIAGNOSTIC TESTING    ECHO FINDINGS: 1/2021- EF 60-65%, normal RV size and fx., mild mitral annular calcification, PASP 40.9 mmHg-mild pulm HTN    ALLERGIES: Allergies    No Known Allergies    Intolerances      PAST MEDICAL HISTORY  No pertinent past medical history    Hypertension, unspecified type    Hyperlipidemia, unspecified hyperlipidemia type    Borderline diabetic        PAST SURGICAL HISTORY  No significant past surgical history        FAMILY HISTORY:      SOCIAL HISTORY:  Denies smoking/alcohol/drugs        CURRENT MEDICATIONS:           HOME MEDICATIONS:  Chlorathalidone 25mg  Lipitor 10mg  Avapro 150 mg oral tablet: 1 tab(s) orally once a day (15 Apr 2021 09:42)  Coreg 6.25 mg oral tablet: 1 tab(s) orally 2 times a day (15 Apr 2021 09:42)      Vital Signs Last 24 Hrs  T(C): 36.6 (15 Apr 2021 08:58), Max: 36.6 (15 Apr 2021 08:58)  T(F): 97.9 (15 Apr 2021 08:58), Max: 97.9 (15 Apr 2021 08:58)  HR: 64 (15 Apr 2021 12:45) (63 - 80)  BP: 141/67 (15 Apr 2021 12:45) (134/78 - 164/89)  RR: 12 (15 Apr 2021 12:45) (12 - 20)  SpO2: 98% (15 Apr 2021 12:45) (95% - 99%)      PHYSICAL EXAM:  Constitutional: Comfortable . No acute distress.   HEENT: Atraumatic and normocephalic , neck is supple . no JVD. No carotid bruit. PEERL   CNS: A&Ox3. No focal deficits. EOMI.   Lymph Nodes: Cervical : Not palpable.  Respiratory: CTAB  Cardiovascular: S1S2 RRR. No murmur/rubs or gallop.  Gastrointestinal: Soft non-tender and non distended . +Bowel sounds. negative Mitchell's sign  Extremities: No edema.   Psychiatric: Calm, no agitation.  Skin: No skin rash/ulcers visualized to face, hands or feet     Intake and output:     LABS:                        13.9   7.58  )-----------( 312      ( 15 Apr 2021 09:18 )             42.8     04-15    135  |  96<L>  |  17.0  ----------------------------<  120<H>  3.6   |  30.0<H>  |  0.61    Ca    9.8      15 Apr 2021 09:18    TPro  8.0  /  Alb  4.8  /  TBili  0.6  /  DBili  x   /  AST  25  /  ALT  28  /  AlkPhos  95  04-15    CARDIAC MARKERS ( 15 Apr 2021 09:18 )  x     / <0.01 ng/mL / x     / x     / x          PT/INR - ( 15 Apr 2021 09:18 )   PT: 13.3 sec;   INR: 1.15 ratio         PTT - ( 15 Apr 2021 09:18 )  PTT:33.4 sec      INTERPRETATION OF TELEMETRY: SB HR @ 50s  ECG:  NSR HR @ 53, T wave abnormalities throughout    RADIOLOGY & ADDITIONAL STUDIES:    X-ray:  < from: Xray Chest 2 Views PA/Lat (01.08.21 @ 09:22) >  FINDINGS:    Frontal and lateral views of the chest demonstrates 5 mm right hilar pulmonary granuloma. The cardiomediastinal silhouette is normal. No acute osseous abnormalities. No pneumonia or CHF.    IMPRESSION:    No acute cardiopulmonary disease process.        CT scan: < from: CT Brain Stroke Protocol (04.15.21 @ 09:19) >  IMPRESSION:    Brain CT without contrast:  No evidence of intracranial hemorrhage or mass effect. If clinical suspicion for acute infarct persists, brain MRI can be obtained if there is no contraindication.    CT perfusion:  No evidence of core infarct.    CT angiography neck:  1.  No hemodynamically significant stenosis of the bilateral cervical ICAs using NASCET criteria.  Patent vertebral arteries.  No evidence of vascular dissection.  2.  2.2 cm right thyroid nodule. Recommend nonemergent thyroid ultrasound.    CT angiography brain: No large vessel occlusion. No evidence of aneurysm.        MRI: pending

## 2021-04-15 NOTE — ED PROVIDER NOTE - PATIENT PORTAL LINK FT
You can access the FollowMyHealth Patient Portal offered by [facility] by registering at the following website: [email or web address]. By joining [employer]’s FollowMyHealth portal, you will also be able to view your health information using other applications (apps) compatible with our system.

## 2021-04-15 NOTE — CONSULT NOTE ADULT - ASSESSMENT
Pt is a 66 y/o female with medical history of HTN who presents to [facility]-ED for Headache, Elated BP, Dizziness. Pt states that since January her BP has been variable and her PCP has placed her on different medications to control it. Last night, patient complained of HA so she went to bed. However this morning, patient woke up and still had a HA and associated dizziness. When she checked her BP it was 157/115 so patient came to ED. In ED, ECG-NSR HR @ 53, T wave abnormalities throughout, Trop#1-negative, CT/CTA Head and Neck-No evidence of intracranial hemorrhage or mass effect. If clinical suspicion for acute infarct persists, brain MRI can be obtained if there is no contraindication/ No hemodynamically significant stenosis of the bilateral cervical ICAs using NASCET criteria.  Patent vertebral arteries.  No evidence of vascular dissection. 2.2 cm right thyroid nodule.  No large vessel occlusion. No evidence of aneurysm.     HTN  -Recent BP-141/67  -Repeat ECG  -    Headache  -CT/CTA Head and Neck-No evidence of intracranial hemorrhage or mass effect. If clinical suspicion for acute infarct persists, brain MRI can be obtained if there is no contraindication/ No hemodynamically significant stenosis of the bilateral cervical ICAs using NASCET criteria.  Patent vertebral arteries.  No evidence of vascular dissection. 2.2 cm right thyroid nodule.  No large vessel occlusion. No evidence of aneurysm.   -MR Head-pending   Pt is a 66 y/o female with medical history of HTN who presents to [facility]-ED for Headache, Elated BP, Dizziness. Pt states that since January her BP has been variable and her PCP has placed her on different medications to control it. Last night, patient complained of HA so she went to bed. However this morning, patient woke up and still had a HA and associated dizziness. When she checked her BP it was 157/115 so patient came to ED. In ED, ECG-NSR HR @ 53, T wave abnormalities throughout, Trop#1-negative, CT/CTA Head and Neck-No evidence of intracranial hemorrhage or mass effect. If clinical suspicion for acute infarct persists, brain MRI can be obtained if there is no contraindication/ No hemodynamically significant stenosis of the bilateral cervical ICAs using NASCET criteria.  Patent vertebral arteries.  No evidence of vascular dissection. 2.2 cm right thyroid nodule.  No large vessel occlusion. No evidence of aneurysm.     HTN  -Recent BP-141/67, without meds  -Repeat ECG- SB HR @ 59, R wave progression noted   -No change to BP meds at this time  -Pt may follow up as outpatient    Headache  -CT/CTA Head and Neck-No evidence of intracranial hemorrhage or mass effect. If clinical suspicion for acute infarct persists, brain MRI can be obtained if there is no contraindication/ No hemodynamically significant stenosis of the bilateral cervical ICAs using NASCET criteria.  Patent vertebral arteries.  No evidence of vascular dissection. 2.2 cm right thyroid nodule.  No large vessel occlusion. No evidence of aneurysm.   -MR Head-pending

## 2021-04-20 RX ORDER — AMLODIPINE BESYLATE AND BENAZEPRIL HYDROCHLORIDE 5; 20 MG/1; MG/1
5-20 CAPSULE ORAL
Qty: 60 | Refills: 0 | Status: DISCONTINUED | COMMUNITY
Start: 2021-02-04 | End: 2021-04-20

## 2021-04-22 ENCOUNTER — NON-APPOINTMENT (OUTPATIENT)
Age: 66
End: 2021-04-22

## 2021-04-22 ENCOUNTER — APPOINTMENT (OUTPATIENT)
Dept: CARDIOLOGY | Facility: CLINIC | Age: 66
End: 2021-04-22
Payer: COMMERCIAL

## 2021-04-22 VITALS
WEIGHT: 183 LBS | BODY MASS INDEX: 30.49 KG/M2 | DIASTOLIC BLOOD PRESSURE: 76 MMHG | TEMPERATURE: 98.3 F | OXYGEN SATURATION: 97 % | HEIGHT: 65 IN | SYSTOLIC BLOOD PRESSURE: 120 MMHG | HEART RATE: 69 BPM

## 2021-04-22 DIAGNOSIS — R73.03 PREDIABETES.: ICD-10-CM

## 2021-04-22 PROCEDURE — 93000 ELECTROCARDIOGRAM COMPLETE: CPT

## 2021-04-22 PROCEDURE — 99213 OFFICE O/P EST LOW 20 MIN: CPT | Mod: 25

## 2021-04-22 PROCEDURE — 99072 ADDL SUPL MATRL&STAF TM PHE: CPT

## 2021-04-22 RX ORDER — CHLORTHALIDONE 25 MG/1
25 TABLET ORAL DAILY
Qty: 60 | Refills: 5 | Status: DISCONTINUED | COMMUNITY
Start: 2021-04-20 | End: 2021-04-22

## 2021-04-22 RX ORDER — IRBESARTAN 150 MG/1
150 TABLET ORAL DAILY
Qty: 90 | Refills: 1 | Status: ACTIVE | COMMUNITY
Start: 2021-04-20

## 2021-04-22 RX ORDER — ATORVASTATIN CALCIUM 10 MG/1
10 TABLET, FILM COATED ORAL DAILY
Qty: 90 | Refills: 1 | Status: ACTIVE | COMMUNITY
Start: 2021-04-20

## 2021-04-22 NOTE — HISTORY OF PRESENT ILLNESS
[FreeTextEntry1] : 64 y/o HF with PMH HTN presents for follow up visit. Patient was previously seen by me in 2/2021. After that visit, she was seen in the ED due to abnormal BP with systolic >170. She reports feeling dizzy and weak. At that time, she was on chlorthalidone, which she self-dc'ed due to persistent dizziness, and states her symptoms and BP have significantly improved. Currently feels well with no complaints. Denies CP, SOB, palpitations, dizziness/lightheadedness, syncope/near-syncope, LE swelling, orthopnea, or PND. Of note, patient had a CT and MRI while in the hospital, which revealed a thyroid nodule. \par \par EKG (1/19/21): SR, normal axis, nonspecific T wave changes \par \par Echo (1/25/21): \par LVEF 60-65%, normal LV function, normal RV size/function, mild pulmonary HTN\par \par

## 2021-04-22 NOTE — ASSESSMENT
[FreeTextEntry1] : #HTN\par Controlled\par Continue coreg, irbesartan\par DASH Diet discussed\par \par #HLD \par LDL unavailable \par Continue statin\par \par #Borderline DM\par Follow up with PCP\par \par #Thyroid nodule\par Follow up with PCP\par \par RTC 6 months

## 2021-04-23 ENCOUNTER — APPOINTMENT (OUTPATIENT)
Dept: NEUROLOGY | Facility: CLINIC | Age: 66
End: 2021-04-23
Payer: COMMERCIAL

## 2021-04-23 VITALS
HEIGHT: 65 IN | OXYGEN SATURATION: 95 % | DIASTOLIC BLOOD PRESSURE: 84 MMHG | BODY MASS INDEX: 30.82 KG/M2 | WEIGHT: 185 LBS | TEMPERATURE: 98.5 F | HEART RATE: 68 BPM | SYSTOLIC BLOOD PRESSURE: 145 MMHG

## 2021-04-23 DIAGNOSIS — Z83.3 FAMILY HISTORY OF DIABETES MELLITUS: ICD-10-CM

## 2021-04-23 DIAGNOSIS — Z00.00 ENCOUNTER FOR GENERAL ADULT MEDICAL EXAMINATION W/OUT ABNORMAL FINDINGS: ICD-10-CM

## 2021-04-23 DIAGNOSIS — R51.9 HEADACHE, UNSPECIFIED: ICD-10-CM

## 2021-04-23 DIAGNOSIS — Z86.79 PERSONAL HISTORY OF OTHER DISEASES OF THE CIRCULATORY SYSTEM: ICD-10-CM

## 2021-04-23 PROCEDURE — 99203 OFFICE O/P NEW LOW 30 MIN: CPT

## 2021-04-23 PROCEDURE — 99072 ADDL SUPL MATRL&STAF TM PHE: CPT

## 2021-04-25 NOTE — CONSULT LETTER
[Dear  ___] : Dear  [unfilled], [Courtesy Letter:] : I had the pleasure of seeing your patient, [unfilled], in my office today. [Please see my note below.] : Please see my note below. [Sincerely,] : Sincerely, [FreeTextEntry3] : Ratna Lobato NP\par [facility] [place]\par [address] [place] [address]\par Phone: [phone]; Fax: [phone]

## 2021-04-25 NOTE — DISCUSSION/SUMMARY
[FreeTextEntry1] : Ms. Loe is a 65 year-old woman that presented to the office for post ED visit follow-up. Neuroimaging negative for acute intracranial pathology. No neurological deficits were present on exam and she has not had additional episodes of headaches since being seen in the ED. She has seen her cardiologist who will be addressing her antihypertensive medication regimen. She was educated on signs/symptoms of stroke and the importance of blood pressure management as well as cholesterol and blood sugar management in stroke prevention. Labs ordered for HgbA1C and lipid profile. Follow-up with PCP for health maintenance and incidental 2.2 cm right thyroid nodule on CTA neck. Follow-up with me as needed. She was instructed to call the office if her condition worsens, or she experiences any new or concerning symptoms. Patient educated on signs/symptoms of stroke and to call 911 if she begins experiencing any stroke symptoms. All of patient's questions and concerns were addressed.

## 2021-04-25 NOTE — PHYSICAL EXAM
[FreeTextEntry1] : GENERAL PHYSICAL EXAM:\par GEN: no distress, normal affect\par HEENT: NCAT, OP clear\par EYES: sclera white, conjunctiva clear, no nystagmus\par NECK: supple\par CV: normal rhythm\par PULM: no respiratory distress, normal rhythm and effort\par EXT: no edema, no cyanosis\par MSK: muscle tone and strength normal\par SKIN: warm, dry, no rash or lesion on exposed skin \par \par NEUROLOGICAL EXAM:\par Mental Status\par Orientation: alert and oriented to person, place, time, and situation\par Language: clear and fluent, intact comprehension and repetition\par \par Cranial Nerves\par II: visual fields full to confrontation \par III, IV, VI: PERRL, EOMI\par V, VII: facial sensation and movement intact and symmetric \par VIII: hearing intact \par IX, X: uvula midline, soft palate elevates normally \par XI: BL shoulder shrug intact \par XII: tongue midline\par \par Motor\par Shoulder abd: 5 (R), 5 (L)\par EF/EE: 5 (R), 5 (L)\par WF/WE: 5 (R), 5 (L)\par hand : 5 (R), 5 (L)\par HF/HE: 5 (R), 5 (L)\par KF/KE: 5 (R), 5 (L)\par DF/PF: 5 (R), 5 (L) \par Tone and bulk are normal in upper and lower limbs\par No pronator drift\par \par Sensation\par Intact to light touch in all 4 EXTs\par \par Reflex\par 2+ in BL biceps, brachioradialis, patella\par \par Coordination\par Normal FTN bilaterally\par Dysdiadochokinesia not present. \par Able to perform rapid, alternating movements\par \par Gait\par Normal stance, stride, and pivot turn\par Negative Romberg

## 2021-04-25 NOTE — HISTORY OF PRESENT ILLNESS
[FreeTextEntry1] : PCP: Dr. Simi Arellano\par \par Ms. Leo is a 65 year-old woman with PMH HTN that presented to the ED at [facility] with c/o headache and dizziness. She was hypertensive in the ED but states that she had not yet taken her afternoon dose of blood pressure medication. He reports experiencing headaches when her blood pressure is elevated. She described this headaches as sharp, 8/10 severity in the frontal region of her head and associated dizziness. CT head was negative. CTA head/neck showed no evidence of hemodynamically significant stenosis or vascular dissection. MRI head demonstrated no acute intracranial abnormality. She denies current headache, loss of vision, changes in personality or cognition, difficulty speaking or finding the correct words, unilateral weakness or impaired sensation, problems with coordination, difficulty walking or falls. \par \par \par

## 2021-04-26 ENCOUNTER — APPOINTMENT (OUTPATIENT)
Dept: CARDIOLOGY | Facility: CLINIC | Age: 66
End: 2021-04-26

## 2021-04-28 ENCOUNTER — APPOINTMENT (OUTPATIENT)
Dept: PULMONOLOGY | Facility: CLINIC | Age: 66
End: 2021-04-28
Payer: COMMERCIAL

## 2021-04-28 VITALS
WEIGHT: 188 LBS | BODY MASS INDEX: 32.1 KG/M2 | SYSTOLIC BLOOD PRESSURE: 116 MMHG | HEART RATE: 68 BPM | HEIGHT: 64 IN | TEMPERATURE: 98 F | RESPIRATION RATE: 16 BRPM | OXYGEN SATURATION: 97 % | DIASTOLIC BLOOD PRESSURE: 80 MMHG

## 2021-04-28 DIAGNOSIS — R93.89 ABNORMAL FINDINGS ON DIAGNOSTIC IMAGING OF OTHER SPECIFIED BODY STRUCTURES: ICD-10-CM

## 2021-04-28 DIAGNOSIS — Z86.39 PERSONAL HISTORY OF OTHER ENDOCRINE, NUTRITIONAL AND METABOLIC DISEASE: ICD-10-CM

## 2021-04-28 DIAGNOSIS — Z23 ENCOUNTER FOR IMMUNIZATION: ICD-10-CM

## 2021-04-28 DIAGNOSIS — R22.9 LOCALIZED SWELLING, MASS AND LUMP, UNSPECIFIED: ICD-10-CM

## 2021-04-28 DIAGNOSIS — R06.02 SHORTNESS OF BREATH: ICD-10-CM

## 2021-04-28 PROCEDURE — 99072 ADDL SUPL MATRL&STAF TM PHE: CPT

## 2021-04-28 PROCEDURE — 99204 OFFICE O/P NEW MOD 45 MIN: CPT

## 2021-04-28 NOTE — PHYSICAL EXAM
[No Acute Distress] : no acute distress [Well Nourished] : well nourished [Well Developed] : well developed [TextBox_2] : Pt is obese

## 2021-04-28 NOTE — CONSULT LETTER
[Dear  ___] : Dear  [unfilled], [Consult Letter:] : I had the pleasure of evaluating your patient, [unfilled]. [Please see my note below.] : Please see my note below. [Consult Closing:] : Thank you very much for allowing me to participate in the care of this patient.  If you have any questions, please do not hesitate to contact me. [Sincerely,] : Sincerely, [FreeTextEntry3] : Rosalio Ortiz MD, FCCP, D. ABSM\par Pulmonary and Sleep Medicine\par [facility] [place]

## 2021-04-28 NOTE — REASON FOR VISIT
[Initial] : an initial visit [Abnormal CXR/ Chest CT] : an abnormal CXR/ chest CT [Shortness of Breath] : shortness of breath [Pulmonary Nodules] : pulmonary nodules [Obesity] : obesity

## 2021-04-28 NOTE — DISCUSSION/SUMMARY
[FreeTextEntry1] : \par #1. Will schedule PFTs in near future to assess lung function with Covid testing prior to PFTs\par #2. The patient does not appear to require chronic BD therapy at this time\par #3. Diet and exercise for weight loss\par #4. SOBOE is likely related to weight or deconditioning\par #5. Chest CT to further evaluate for additional nodules\par #6. Quantiferon Gold test given calcified nodule but reports no prior TB exposure and reports negative annual PPD while working\par #7. S/p both Covid vaccines\par #8. Neuro f/u for H/a\par #9. Cardiology f/u to control HTN\par #10. F/u in one month with chest CT and PFTs\par #11. Reviewed risks of exposure and symptoms of Covid-19 virus, including how the virus is spread and precautions to avoid myrtle virus.\par \par Patient's questions were answered and patient appears to understand these recommendations

## 2021-04-28 NOTE — HISTORY OF PRESENT ILLNESS
[Never] : never [Initial Evaluation] : an initial evaluation of [Excess Weight] : excess weight [Currently Experiencing] : The patient is currently experiencing symptoms. [Dyspnea] : dyspnea [Low Calorie Diet] : low calorie diet [Fair Compliance] : fair compliance with treatment [Fair Tolerance] : fair tolerance of treatment [Poor Symptom Control] : poor symptom control [Dyslipidemia] : dyslipidemia [Hypertension] : hypertension [High] : high [Low Calorie] : low calorie [Well Balanced Diet] : well balanced meals [Infrequently] : exercises infrequently [Walking] : walking [TextBox_4] : Pt was in [facility] ER on 4/15/21 with the following evaluation:\par \par - Chief Complaint: The patient is a 65y Female complaining of headache.\par - HPI Objective Statement: SEEN AT 0906 AM\par 	64 yo and female with c/c headache and dizziness. Had HA last night but was able to fall asleep approximately 10 PM. This am approx 5/5:30 am she had L sided headache and dizziness. Her BP was 157/113 on her BP machine. She took COREG 1/2 hour earlier than usual and came to the hospital. In ER she gives me above hx and tells me that her BP has not been well controlled x 2 months. She\par is under the care of Dr Simi Arellano.	No cigs No alcohol . Med hx Htn hld borderline DM\par \par CXR at [facility] in 1/8/21 revealed a 5 mm R hilar calcified granuloma. Pt presents for further evaluation.\par Patient c/o SOBOE but is otherwise without associated respiratory complaints. \par Pt denies prior TB exposure and reports negative PPD annually.\par Pt denies significant sleep complaints. \par

## 2021-04-28 NOTE — REVIEW OF SYSTEMS
[SOB on Exertion] : sob on exertion [Headache] : headache [Thyroid Problem] : thyroid problem [Fever] : no fever [Chills] : no chills [Nasal Congestion] : no nasal congestion [Postnasal Drip] : no postnasal drip [Sinus Problems] : no sinus problems [Cough] : no cough [Chest Tightness] : no chest tightness [Sputum] : no sputum [Dyspnea] : no dyspnea [Pleuritic Pain] : no pleuritic pain [Wheezing] : no wheezing [Chest Discomfort] : no chest discomfort [Edema] : no edema [Palpitations] : no palpitations [Syncope] : no syncope [Seasonal Allergies] : no seasonal allergies [GERD] : no gerd [Abdominal Pain] : no abdominal pain [Nausea] : no nausea [Vomiting] : no vomiting [Diarrhea] : no diarrhea [Constipation] : no constipation [Back Pain] : no back pain [Anemia] : no anemia [Seizures] : no seizures [Dizziness] : no dizziness [Numbness] : no numbness [Paralysis] : no paralysis [Confusion] : no confusion [Depression] : no depression [Anxiety] : no anxiety [Diabetes] : no diabetes

## 2021-05-03 ENCOUNTER — OUTPATIENT (OUTPATIENT)
Dept: OUTPATIENT SERVICES | Facility: HOSPITAL | Age: 66
LOS: 1 days | End: 2021-05-03
Payer: COMMERCIAL

## 2021-05-03 ENCOUNTER — APPOINTMENT (OUTPATIENT)
Dept: ULTRASOUND IMAGING | Facility: CLINIC | Age: 66
End: 2021-05-03
Payer: COMMERCIAL

## 2021-05-03 ENCOUNTER — TRANSCRIPTION ENCOUNTER (OUTPATIENT)
Age: 66
End: 2021-05-03

## 2021-05-03 DIAGNOSIS — E04.1 NONTOXIC SINGLE THYROID NODULE: ICD-10-CM

## 2021-05-03 PROCEDURE — 76536 US EXAM OF HEAD AND NECK: CPT

## 2021-05-03 PROCEDURE — 76536 US EXAM OF HEAD AND NECK: CPT | Mod: 26

## 2021-05-12 ENCOUNTER — TRANSCRIPTION ENCOUNTER (OUTPATIENT)
Age: 66
End: 2021-05-12

## 2021-06-01 ENCOUNTER — EMERGENCY (EMERGENCY)
Facility: HOSPITAL | Age: 66
LOS: 1 days | Discharge: HOME | End: 2021-06-01
Attending: EMERGENCY MEDICINE
Payer: COMMERCIAL

## 2021-06-01 VITALS
HEIGHT: 65 IN | DIASTOLIC BLOOD PRESSURE: 89 MMHG | TEMPERATURE: 98 F | OXYGEN SATURATION: 100 % | RESPIRATION RATE: 16 BRPM | SYSTOLIC BLOOD PRESSURE: 166 MMHG | HEART RATE: 68 BPM

## 2021-06-01 PROCEDURE — 99282 EMERGENCY DEPT VISIT SF MDM: CPT

## 2021-06-01 NOTE — ED STATDOCS - HIV OFFER
Anesthesia Pre Eval Note    Anesthesia ROS/Med Hx        Anesthetic Complication History:  Patient does not have a history of anesthetic complications      Pulmonary Review:  Patient does not have a pulmonary history      Neuro/Psych Review:  Patient does not have a neuro/psych history       Cardiovascular Review:  Exercise tolerance: good (>4 METS)  Positive for hyperlipidemia    GI/HEPATIC/RENAL Review:    Positive for GERD    End/Other Review:  Patient does not have an endo/other history    Additional Results:     ALLERGIES:  No Known Allergies       Lab Results       Component                Value               Date                       WBC                      6.9                 03/13/2014                 RBC                      4.89                03/13/2014                 HGB                      14.6                03/13/2014                 HCT                      44.0                03/13/2014                 MCHC                     33.2                03/13/2014                 SODIUM                   141                 07/17/2020                 SODIUM                   142                 07/08/2019                 POTASSIUM                4.5                 07/17/2020                 POTASSIUM                4.2                 07/08/2019                 CHLORIDE                 108 (H)             07/17/2020                 CHLORIDE                 111 (H)             07/08/2019                 CO2                      26                  07/17/2020                 CO2                      25                  07/08/2019                 GLUCOSE                  76                  07/17/2020                 GLUCOSE                  82                  07/08/2019                 BUN                      13                  07/17/2020                 BUN                      18                  07/08/2019                 CREATININE               0.79                07/17/2020                  CREATININE               0.91                07/08/2019                 GFRESTIMATE              81 (L)              07/17/2020                 GFRA                     79                  07/08/2019                 GFRNA                    69                  07/08/2019                 CALCIUM                  8.9                 07/17/2020                 CALCIUM                  8.6                 07/08/2019                 PLT                      359                 03/13/2014                 PLT                      354                 02/04/2013             Past Medical History:  3/23/2018: Basal cell carcinoma (BCC) of skin of right ear      Comment:  04/16/2018    Basal Cell    R preauricular    Mohs &                Repair    Dr. Gama  No date: Cataract      Comment:  starting in Left eye  8/21/2018: Colon polyp  No date: Esophageal reflux disease  No date: Keratosis, actinic  2014: Lymphocytic colitis    Past Surgical History:  9-20-11: Colonoscopy      Comment:  Repeat in 5 year due to hx of hyperplastic polyp  3/10/2014 (5yr): Colonoscopy      Comment:  Lymphocytic colitis, repeat in 5 years Dr. Lipscomb  08/21/2018: Colonoscopy      Comment:  repeat 8 to 10 years-[facility]  No date: Colposcopy bx cervix endocerv curr      Comment:  Dr. riggs  [facility]      Comment:  Right Preauricular Basal Cell CA Mohs        Prior to Admission medications :  Medication esomeprazole (NexIUM) 20 MG capsule, Sig Take 1 capsule by mouth daily (before breakfast). Indications: Gastroesophageal Reflux Disease, Start Date 9/16/20, End Date , Taking? Yes, Authorizing Provider Antoinette Gilliam NP    Medication Multiple Vitamins-Minerals (EYE VITAMINS PO), Sig Take 1 capsule by mouth daily., Start Date , End Date , Taking? Yes, Authorizing Provider Outside Provider    Medication Lactobacillus-Inulin (CULTURELLE DIGESTIVE HEALTH PO), Sig Take 1 capsule by mouth daily., Start  Date , End Date , Taking? Yes, Authorizing Provider Outside Provider    Medication Multiple Vitamins-Minerals (MULTIVITAL PO), Sig Take 1 tablet by mouth daily., Start Date , End Date , Taking? Yes, Authorizing Provider Outside Provider    Medication ibuprofen (MOTRIN) 200 MG tablet, Sig Take 400 mg by mouth every 6 hours as needed., Start Date , End Date , Taking? , Authorizing Provider Outside Provider            Relevant Problems   No relevant active problems       Physical Exam     Airway   Mallampati: II  Neck ROM: Full    Cardiovascular    Cardio Rhythm: Regular  Cardio Rate: Normal    Head Assessment  Head assessment: Normocephalic    General Assessment  General Assessment: Alert and oriented    Pulmonary Exam    Breath sounds clear to auscultation:  Yes    Abdominal Exam  Abdominal exam normal      Anesthesia Plan:  Anesthesia Plan  No phone call attempted due to:  ASA Status: 2    Anesthesia Type: MAC    Induction: Intravenous  Maintenance: TIVA    Checklist  Reviewed: Lab Results, NPO Status, Problem list, Medications, Allergies, Patient Summary and Past Med History    Informed Consent  The proposed anesthetic plan, including its risks and benefits, have been discussed with the Patient  - along with the risks and benefits of alternatives.  Questions were encouraged and answered and the patient and/or representative understands and agrees to proceed.     Blood Products: Not Anticipated    Consent/Risks Discussed Statement:  I have discussed with the patient, and/or patient's legal representative, the nature and purpose of anesthesia for the planned procedure. I have discussed elements of the patient's history or examination, as noted above and/or as follows, that place the patient at higher risk of complications - -    I discussed with the patient (and/or patient's legal representative) the risks and benefits of the proposed anesthesia plan, MAC, which may include services performed by other anesthesia  providers.    Alternative anesthesia plans, if available, were reviewed with the patient (and/or patient's legal representative). Discussion has been held with the patient (and/or patient's legal representative) regarding risks of anesthesia, which include emergent situations that may require change in anesthesia plan, as well as the following: nausea, vomiting and aspiration    The patient (and/or patient's legal representative) has indicated understanding, his/her questions have been answered, and he/she wishes to proceed with the planned anesthetic.     Opt out

## 2021-06-01 NOTE — ED STATDOCS - NS_ ATTENDINGSCRIBEDETAILS _ED_A_ED_FT
I, Rocco Pierre, performed the initial face to face bedside interview with this patient regarding history of present illness, review of symptoms and relevant past medical, social and family history.  I completed an independent physical examination.   surgical history, medical decision making, and physical examination was documented by the scribe in my presence and I attest to the accuracy of the documentation.

## 2021-06-01 NOTE — ED ADULT TRIAGE NOTE - CHIEF COMPLAINT QUOTE
pt states blood pressure is very high.  denies blurred vision denies headache denies neuro symptoms.  states has seen 3 cardiolgist "no one can control my blood pressure"  pt states before medication bp was 180/108 then took double her bp meds  denies chest pain denies shortness of breath

## 2021-06-01 NOTE — ED STATDOCS - PATIENT PORTAL LINK FT
You can access the FollowMyHealth Patient Portal offered by [facility] by registering at the following website: [email or web address]. By joining [employer]’s FollowMyHealth portal, you will also be able to view your health information using other applications (apps) compatible with our system.

## 2021-06-01 NOTE — ED STATDOCS - OBJECTIVE STATEMENT
64 y/o female with PMHx of HTN, HLD, borderline DM c/o hypertension. Pt states her pressure was 180/108 at home. Pt states she has seen 3 cardiologist who have been unable to control her HTN. Pt took her beta blocker last night at 8 pm. Pt took beta blocker again this morning. Pt denies chest pain, SOB.

## 2021-06-02 ENCOUNTER — EMERGENCY (EMERGENCY)
Facility: HOSPITAL | Age: 66
LOS: 1 days | Discharge: HOME | End: 2021-06-02
Attending: EMERGENCY MEDICINE
Payer: COMMERCIAL

## 2021-06-02 VITALS
HEART RATE: 66 BPM | SYSTOLIC BLOOD PRESSURE: 159 MMHG | WEIGHT: 184.97 LBS | RESPIRATION RATE: 20 BRPM | DIASTOLIC BLOOD PRESSURE: 94 MMHG | OXYGEN SATURATION: 98 % | HEIGHT: 65 IN | TEMPERATURE: 98 F

## 2021-06-02 PROCEDURE — 99282 EMERGENCY DEPT VISIT SF MDM: CPT

## 2021-06-02 PROCEDURE — 99284 EMERGENCY DEPT VISIT MOD MDM: CPT

## 2021-06-02 PROCEDURE — 99283 EMERGENCY DEPT VISIT LOW MDM: CPT

## 2021-06-02 RX ORDER — HYDROCHLOROTHIAZIDE 25 MG
12.5 TABLET ORAL ONCE
Refills: 0 | Status: DISCONTINUED | OUTPATIENT
Start: 2021-06-02 | End: 2021-06-06

## 2021-06-02 NOTE — ED PROVIDER NOTE - CLINICAL SUMMARY MEDICAL DECISION MAKING FREE TEXT BOX
cardiology assessment noted; patient feels comfortable with discharge and medical plan; PMD or clinic follow up recommended for reassessment. Patient is aware of signs/symptoms to return to the emergency department.

## 2021-06-02 NOTE — ED PROVIDER NOTE - PATIENT PORTAL LINK FT
You can access the FollowMyHealth Patient Portal offered by [facility] by registering at the following website: [email or web address]. By joining [employer]’s FollowMyHealth portal, you will also be able to view your health information using other applications (apps) compatible with our system.

## 2021-06-02 NOTE — ED PROVIDER NOTE - CARE PROVIDER_API CALL
Renetta Meier (DO)  Internal Medicine  [address] [place] [id number]  Phone: [phone]  Fax: [phone]  Follow Up Time:

## 2021-06-02 NOTE — ED ADULT TRIAGE NOTE - CHIEF COMPLAINT QUOTE
Pt arrives to ED c/o " elevated blood pressure at home  reading 179/107  " , pt was eval here yesterday for the same complaint and was told to fallow up with primary doctor . Pt denies headache feeling lightheaded  or chest pain

## 2021-06-02 NOTE — CONSULT NOTE ADULT - ATTENDING COMMENTS
64 y/o F with PMH HTN admitted with uncontrolled BP. Patient appears very anxious, checking her BP multiple times a day and adjusting medication doses on her own. Advised to check her BP 1 hour after taking meds and to take antiHTN as prescribed.     #HTN  Most recent /94  Patient states she took double dose of coreg without any change in her BP  Continue current dose of coreg, irbesartan  Start HCTZ 12.5mg daily  Follow up with outpatient for further management    Thank you for allowing me to participate in the care of this patient. Please re consult as needed.

## 2021-06-02 NOTE — CONSULT NOTE ADULT - ASSESSMENT
Pt is a 66 y/o female with medical history of HTN who presents to [facility]ED for HTN. Pt states that she has been experiencing elevated BP over the weekend SBP as high as 200s. 6/1, Pt BP was 161/99 HR-61. Pt took double dose of Coreg and came to ED. In ED, patient BP was 166/89. Pt was DC'd from ED and was advised to continue to double the dose of her Coreg. Today, pt BP was elevated as well, before coming to ED BP was 175/105. Pt denies associated symptoms of chest pain, palpitations SOB. In ED, /94.     HTN  -Recent BP-159/94  -Pt advised to only check BP twice a day after BP meds administered  -Start HCTZ 12.5mg   -Cont. Coreg and Irbesartan  -Follow up within 4 weeks in outpatient cardiology office with Dr. Meier

## 2021-06-02 NOTE — CONSULT NOTE ADULT - SUBJECTIVE AND OBJECTIVE BOX
[facility]                                                               Office:  [address] [place] [address]                                                              Telephone: [phone]. Fax:[phone]                                                                        CARDIOLOGY CONSULTATION NOTE                                                                                             Consult requested by:  Dr. Leija  Reason for Consultation: HTN  PMD: Dr. Arellano  Primary Cardiology: Dr. Meier  History obtained by: Patient and medical record   obtained: No    Chief complaint:    Patient is a 65y old  Female who presents with a chief complaint of HTN      HPI: Pt is a 66 y/o female with medical history of HTN who presents to [facility]ED for HTN. Pt states that she has been experiencing elevated BP over the weekend SBP as high as 200s. 6/1, Pt BP was 161/99 HR-61. Pt took double dose of Coreg and came to ED. In ED, patient BP was 166/89. Pt was DC'd from ED and was advised to continue to double the dose of her Coreg. Today, pt BP was elevated as well, before coming to ED BP was 175/105. Pt denies associated symptoms of chest pain, palpitations SOB. In ED, /94.         REVIEW OF SYMPTOMS:     CONSTITUTIONAL: No fever, weight loss, or fatigue  ENMT:  No difficulty hearing, tinnitus, vertigo; No sinus or throat pain  NECK: No pain or stiffness  CARDIOVASCULAR: See HPI  RESPIRATORY: No Dyspnea on exertion, Shortness of breath, cough, wheezing  : No dysuria, no hematuria   GI: No dark color stool, no melena, no diarrhea, no constipation, no abdominal pain   NEURO: No headache, no dizziness, no slurred speech   MUSCULOSKELETAL: No joint pain or swelling; No muscle, back, or extremity pain  PSYCH: No agitation, no anxiety.    ALL OTHER REVIEW OF SYSTEMS ARE NEGATIVE.      PREVIOUS DIAGNOSTIC TESTING    ECHO FINDINGS: 1/2021- LVEF 60-65%, normal LV fx., normal RV fx., and size, mild pulm HTN      ALLERGIES: Allergies    No Known Allergies    Intolerances          PAST MEDICAL HISTORY  No pertinent past medical history    Hypertension, unspecified type    Hyperlipidemia, unspecified hyperlipidemia type    Borderline diabetic        PAST SURGICAL HISTORY  No significant past surgical history        FAMILY HISTORY:  No pertinent family history in first degree relatives        SOCIAL HISTORY:  Denies smoking/alcohol/drugs    CURRENT MEDICATIONS:  hydrochlorothiazide 12.5 milliGRAM(s) Oral Once       HOME MEDICATIONS:  Irbesartan 150mg  Coreg 6.25mg    Vital Signs Last 24 Hrs  T(C): 36.7 (02 Jun 2021 11:35), Max: 36.7 (02 Jun 2021 11:35)  T(F): 98 (02 Jun 2021 11:35), Max: 98 (02 Jun 2021 11:35)  HR: 66 (02 Jun 2021 11:35) (66 - 66)  BP: 159/94 (02 Jun 2021 11:35) (159/94 - 159/94)  RR: 20 (02 Jun 2021 11:35) (20 - 20)  SpO2: 98% (02 Jun 2021 11:35) (98% - 98%)      PHYSICAL EXAM:  Constitutional: Comfortable . No acute distress.   HEENT: Atraumatic and normocephalic , neck is supple . no JVD. No carotid bruit. PEERL   CNS: A&Ox3. No focal deficits. EOMI.   Lymph Nodes: Cervical : Not palpable.  Respiratory: CTAB  Cardiovascular: S1S2 RRR. No murmur/rubs or gallop.  Gastrointestinal: Soft non-tender and non distended . +Bowel sounds. negative Mitchell's sign.  Extremities: No edema.   Psychiatric: Calm . no agitation.  Skin: No skin rash/ulcers visualized to face, hands or feet.    Intake and output:     LABS:

## 2021-06-04 ENCOUNTER — OUTPATIENT (OUTPATIENT)
Dept: OUTPATIENT SERVICES | Facility: HOSPITAL | Age: 66
LOS: 1 days | End: 2021-06-04
Payer: COMMERCIAL

## 2021-06-04 ENCOUNTER — APPOINTMENT (OUTPATIENT)
Dept: ULTRASOUND IMAGING | Facility: CLINIC | Age: 66
End: 2021-06-04
Payer: COMMERCIAL

## 2021-06-04 ENCOUNTER — RESULT REVIEW (OUTPATIENT)
Age: 66
End: 2021-06-04

## 2021-06-04 DIAGNOSIS — E04.1 NONTOXIC SINGLE THYROID NODULE: ICD-10-CM

## 2021-06-04 DIAGNOSIS — Z00.8 ENCOUNTER FOR OTHER GENERAL EXAMINATION: ICD-10-CM

## 2021-06-04 PROCEDURE — 88173 CYTOPATH EVAL FNA REPORT: CPT | Mod: 26

## 2021-06-04 PROCEDURE — 10005 FNA BX W/US GDN 1ST LES: CPT

## 2021-06-04 PROCEDURE — 88173 CYTOPATH EVAL FNA REPORT: CPT

## 2021-06-07 ENCOUNTER — APPOINTMENT (OUTPATIENT)
Dept: ULTRASOUND IMAGING | Facility: CLINIC | Age: 66
End: 2021-06-07
Payer: COMMERCIAL

## 2021-06-07 ENCOUNTER — OUTPATIENT (OUTPATIENT)
Dept: OUTPATIENT SERVICES | Facility: HOSPITAL | Age: 66
LOS: 1 days | End: 2021-06-07
Payer: COMMERCIAL

## 2021-06-07 DIAGNOSIS — Z00.8 ENCOUNTER FOR OTHER GENERAL EXAMINATION: ICD-10-CM

## 2021-06-07 PROCEDURE — 93975 VASCULAR STUDY: CPT | Mod: 26

## 2021-06-07 PROCEDURE — 93975 VASCULAR STUDY: CPT

## 2021-06-08 DIAGNOSIS — Z01.818 ENCOUNTER FOR OTHER PREPROCEDURAL EXAMINATION: ICD-10-CM

## 2021-06-09 ENCOUNTER — TRANSCRIPTION ENCOUNTER (OUTPATIENT)
Age: 66
End: 2021-06-09

## 2021-06-10 ENCOUNTER — NON-APPOINTMENT (OUTPATIENT)
Age: 66
End: 2021-06-10

## 2021-06-10 ENCOUNTER — APPOINTMENT (OUTPATIENT)
Dept: CARDIOLOGY | Facility: CLINIC | Age: 66
End: 2021-06-10
Payer: COMMERCIAL

## 2021-06-10 VITALS
HEIGHT: 64 IN | DIASTOLIC BLOOD PRESSURE: 76 MMHG | HEART RATE: 62 BPM | WEIGHT: 183 LBS | OXYGEN SATURATION: 97 % | TEMPERATURE: 98.6 F | BODY MASS INDEX: 31.24 KG/M2 | SYSTOLIC BLOOD PRESSURE: 110 MMHG

## 2021-06-10 DIAGNOSIS — E78.5 HYPERLIPIDEMIA, UNSPECIFIED: ICD-10-CM

## 2021-06-10 DIAGNOSIS — R94.31 ABNORMAL ELECTROCARDIOGRAM [ECG] [EKG]: ICD-10-CM

## 2021-06-10 DIAGNOSIS — I10 ESSENTIAL (PRIMARY) HYPERTENSION: ICD-10-CM

## 2021-06-10 DIAGNOSIS — E66.9 OBESITY, UNSPECIFIED: ICD-10-CM

## 2021-06-10 PROCEDURE — 99214 OFFICE O/P EST MOD 30 MIN: CPT

## 2021-06-10 PROCEDURE — 93000 ELECTROCARDIOGRAM COMPLETE: CPT

## 2021-06-10 PROCEDURE — 99072 ADDL SUPL MATRL&STAF TM PHE: CPT

## 2021-06-10 RX ORDER — CARVEDILOL 6.25 MG/1
6.25 TABLET, FILM COATED ORAL TWICE DAILY
Qty: 180 | Refills: 1 | Status: DISCONTINUED | COMMUNITY
Start: 2021-01-19 | End: 2021-06-10

## 2021-06-10 RX ORDER — HYDROCHLOROTHIAZIDE 12.5 MG/1
12.5 CAPSULE ORAL
Qty: 30 | Refills: 3 | Status: ACTIVE | COMMUNITY
Start: 2021-06-10

## 2021-06-10 RX ORDER — FOLIC ACID/VIT B COMPLEX AND C 400 MCG
TABLET ORAL DAILY
Refills: 0 | Status: DISCONTINUED | COMMUNITY
Start: 2021-01-19 | End: 2021-06-10

## 2021-06-10 RX ORDER — MULTIVIT-MIN/IRON/FOLIC ACID/K 18-600-40
500 CAPSULE ORAL DAILY
Refills: 0 | Status: DISCONTINUED | COMMUNITY
Start: 2021-01-19 | End: 2021-06-10

## 2021-06-10 NOTE — CARDIOLOGY SUMMARY
[de-identified] : 6/10/21: SR, nonspecific T wave changes \par \par 4/22/21: SR, nonspecific T wave changes \par \par 1/19/21: SR, normal axis, nonspecific T wave changes  [de-identified] : Echo (1/25/21): \par LVEF 60-65%, normal LV function, normal RV size/function, mild pulmonary HTN

## 2021-06-10 NOTE — ASSESSMENT
[FreeTextEntry1] : #HTN\par Currently controlled\par Continue current medications (coreg, irbesartan, HCTZ)\par Advised patient to keep a BP log of 2-3 readings/ day over the course of a few weeks in order to see her BP trend throughout the day and make adjustments as needed\par DASH diet\par \par #HLD \par Continue statin\par \par #Abnormal EKG\par Nonspecific T wave changes\par Patient denies symptoms at present, but has previously c/o QUINTEROS \par Check ETT\par \par #Obesity\par BMI 31\par Diet/lifestyle modifications discussed\par \par RTC after above workup

## 2021-06-10 NOTE — HISTORY OF PRESENT ILLNESS
[FreeTextEntry1] : 4/22/21:\par 64 y/o HF with PMH HTN presents for follow up visit. Patient was previously seen by me in 2/2021. After that visit, she was seen in the ED due to abnormal BP with systolic >170. She reports feeling dizzy and weak. At that time, she was on chlorthalidone, which she self-dc'ed due to persistent dizziness, and states her symptoms and BP have significantly improved. \par \par Today:\par Since previous visit, patient was seen in the ED 6/2 with elevated BP in the 200s. Was started on HCTZ 12.5mg in addition to coreg and irbesartan. She is still taking her BP multiple times a day, with very varied readings. Has a log with her from yesterday with 6 blood pressure readings that range from 130/88 - 164/112. She otherwise feels well. Denies CP, dyspnea, palpitations, dizziness/lightheadedness, syncope/near-syncope, LE swelling, orthopnea, or PND. Of note, she recently was evaluated by pulmonary with c/o QUINTEROS; is planned for PFTs. Denies symptoms at present.

## 2021-06-11 ENCOUNTER — APPOINTMENT (OUTPATIENT)
Dept: DISASTER EMERGENCY | Facility: CLINIC | Age: 66
End: 2021-06-11

## 2021-06-11 RX ORDER — CARVEDILOL 12.5 MG/1
12.5 TABLET, FILM COATED ORAL TWICE DAILY
Qty: 180 | Refills: 1 | Status: ACTIVE | COMMUNITY

## 2021-06-14 ENCOUNTER — APPOINTMENT (OUTPATIENT)
Dept: NEUROLOGY | Facility: CLINIC | Age: 66
End: 2021-06-14

## 2021-06-15 ENCOUNTER — APPOINTMENT (OUTPATIENT)
Dept: PULMONOLOGY | Facility: CLINIC | Age: 66
End: 2021-06-15

## 2021-07-30 ENCOUNTER — APPOINTMENT (OUTPATIENT)
Dept: CARDIOLOGY | Facility: CLINIC | Age: 66
End: 2021-07-30
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
## 2021-10-28 ENCOUNTER — APPOINTMENT (OUTPATIENT)
Dept: CARDIOLOGY | Facility: CLINIC | Age: 66
End: 2021-10-28

## 2022-03-08 ENCOUNTER — NON-APPOINTMENT (OUTPATIENT)
Age: 67
End: 2022-03-08

## 2022-05-23 NOTE — ED PROVIDER NOTE - NSFOLLOWUPINSTRUCTIONS_ED_ALL_ED_FT
Emerson Courtney from IR reached out and said she had been out last week and that she did bring this to Dr. Attention and that they feel it is on one of the bronchs and pt. Would be better served to have it Bx thru a Bronch. Please Advise. IR was to have reached out to Dr. Maribeth Estrada. -- Please follow up with your doctor(s) within the next 3 days, but seek medical attention sooner if your symptoms persist or worsen.  Please call tomorrow for an appointment.  If you cannot follow-up with your primary doctor please return to the ED for any urgent issues.    -- You were given a copy of your labs and imaging.  Please go over these with your doctor(s).     -- If you have any worsening of symptoms or any other concerns please see your doctor or return to the nearest emergency room immediately.    -- Please continue taking your home medications as directed.  Do not use alcohol when taking any medication (especially antibiotics, tylenol or other pain medication) unless you check with the doctor or pharmacist.    **************************************************************************************************************  Headache    A headache is pain or discomfort felt around the head or neck area. The specific cause of a headache may not be found as there are many types including tension headaches, migraine headaches, and cluster headaches. Watch your condition for any changes. Things you can do to manage your pain include taking over the counter and prescription medications as instructed by your health care provider, lying down in a dark quiet room, limiting stress, getting regular sleep, and refraining from alcohol and tobacco products.    SEEK IMMEDIATE MEDICAL CARE IF YOU HAVE ANY OF THE FOLLOWING SYMPTOMS: fever, vomiting, stiff neck, loss of vision, problems with speech, muscle weakness, loss of balance, trouble walking, passing out, or confusion.

## 2023-07-13 ENCOUNTER — APPOINTMENT (OUTPATIENT)
Dept: MRI IMAGING | Facility: CLINIC | Age: 68
End: 2023-07-13
Payer: COMMERCIAL

## 2023-07-13 ENCOUNTER — OUTPATIENT (OUTPATIENT)
Dept: OUTPATIENT SERVICES | Facility: HOSPITAL | Age: 68
LOS: 1 days | End: 2023-07-13
Payer: COMMERCIAL

## 2023-07-13 DIAGNOSIS — R22.42 LOCALIZED SWELLING, MASS AND LUMP, LEFT LOWER LIMB: ICD-10-CM

## 2023-07-13 PROCEDURE — A9585: CPT

## 2023-07-13 PROCEDURE — 73720 MRI LWR EXTREMITY W/O&W/DYE: CPT | Mod: 26,LT

## 2023-07-13 PROCEDURE — 73720 MRI LWR EXTREMITY W/O&W/DYE: CPT

## 2023-07-15 ENCOUNTER — APPOINTMENT (OUTPATIENT)
Dept: PHYSICAL MEDICINE AND REHAB | Facility: CLINIC | Age: 68
End: 2023-07-15

## 2023-07-17 PROBLEM — Z00.00 ENCOUNTER FOR PREVENTIVE HEALTH EXAMINATION: Status: ACTIVE | Noted: 2023-07-17

## 2023-07-18 ENCOUNTER — APPOINTMENT (OUTPATIENT)
Dept: ORTHOPEDIC SURGERY | Facility: CLINIC | Age: 68
End: 2023-07-18
Payer: COMMERCIAL

## 2023-07-18 ENCOUNTER — APPOINTMENT (OUTPATIENT)
Dept: ORTHOPEDIC SURGERY | Facility: CLINIC | Age: 68
End: 2023-07-18

## 2023-07-18 PROCEDURE — 99204 OFFICE O/P NEW MOD 45 MIN: CPT

## 2023-07-18 PROCEDURE — 20551 NJX 1 TENDON ORIGIN/INSJ: CPT

## 2023-07-19 NOTE — HISTORY OF PRESENT ILLNESS
[de-identified] : PATIENT PRESENTS TODAY FOR EVALUATION OF LT FOOT PAIN. PATIENT STATES THERE IS A LUMP ON THE TOP OF THE FOOT THAT STARTED 3 WEEKS AGO. GOT AN MRI DONE AT [facility]. NO TREATEMTN EXCEPT ASPIRATION OF FLUID.

## 2023-07-19 NOTE — DATA REVIEWED
[Ultrasound] : ultrasound [Foot] : foot [I independently reviewed and interpreted images and report] : I independently reviewed and interpreted images and report [MRI] : MRI [Report was reviewed and noted in the chart] : The report was reviewed and noted in the chart [FreeTextEntry1] : DORSAL EXOSTOSIS MID FOOT.  NO GANGLION.  NORMAL ECHOIC SIGNAL OF MID FOOT . MRI -ARTHRITIC CHANGES IN MID FOOT

## 2023-07-19 NOTE — PHYSICAL EXAM
[Left] : left foot and ankle [Mild] : mild swelling of dorsal foot [1st] : 1st [2nd] : 2nd [3rd] : 3rd [4th] : 4th [2+] : posterior tibialis pulse: 2+ [] : no sign of infection [FreeTextEntry3] : PAIN DORSAL MID FOOT

## 2023-07-19 NOTE — ASSESSMENT
[FreeTextEntry1] : CELESTONE 6 MG APPLIED WITH LIDOCAINE PLAIN 10 MG  TO REDUCE PAIN AND SWELLING\par CELESTONE 2 UNITS, LIDOCAINE PLAIN 2 UNITS MID FOOT LEFT WITH US GUIDANCE \par NSAID OF CHOICE FOR PAIN.\par TYLENOL FOR PAIN\par COLD COMPRESSES.\par NSAID FOR SHOT PAIN.\par WARM COMPRESS \par RTO PRN \par

## 2023-07-20 ENCOUNTER — APPOINTMENT (OUTPATIENT)
Dept: ORTHOPEDIC SURGERY | Facility: CLINIC | Age: 68
End: 2023-07-20

## 2023-07-24 ENCOUNTER — APPOINTMENT (OUTPATIENT)
Dept: ORTHOPEDIC SURGERY | Facility: CLINIC | Age: 68
End: 2023-07-24
Payer: COMMERCIAL

## 2023-07-24 PROCEDURE — 99213 OFFICE O/P EST LOW 20 MIN: CPT

## 2023-07-24 NOTE — ASSESSMENT
[FreeTextEntry1] : PROP INTRAOP AND POST OPERATIVE COURSES WERE DISCUSSED.\par RADIOGRAPHS WERE DISCUSSED AS WELL AS THE PROCEDURE. \par NO GUARANTEE AS TO A RESULT OR CURE.\par MOST LIKELY DO NOT NEED CRUTCHES OR TO BE OFF WEIGHT.\par TOLD SHE MUST KEEP IT DRY AND CLEAN.\par PROPOSED SURGERY: EXOSTOSTECTOMY MET-CUNIEFORM LEFT FOOT \par ANESTHESIA WAS DISCUSSED\par POST OPERATIVE MEDICATIONS WERE DISCUSSED\par SURGERY WILL BE OUTPATIENT \par \par ALL RISKS AND COMPLICATIONS WERE DISCUSSED TODAY WITH THE PATIENT-INCLUDES PAIN, INFECTION, NUMBNESS, LOSS OF FOOT OR LIMB, DIFFICULTY WITH SHOES AND WALKING, PAINFUL SCAR, RESIDUAL SWELLING, DIFFICULTY WEARING SHOES, DIFFICULTY WITH PLAYING ATHLETICS AS WELL AS DEATH FROM BLOOD CLOTS AND ANESTHESIA. \par \par \par

## 2023-07-24 NOTE — HISTORY OF PRESENT ILLNESS
[de-identified] : Patient presents for follow up of the LT foot. Last vist the patient got an injection and states it has helped with the pain. Patient still cannot touch or wear closed toe shoes without pain. Wants to talk about a procedure to correct pain.

## 2023-07-24 NOTE — PHYSICAL EXAM
[Left] : left foot and ankle [1st] : 1st [2nd] : 2nd [2+] : posterior tibialis pulse: 2+ [] : non-antalgic [FreeTextEntry3] : DORSAL EXOSTOSIS FIRST MET-CUNIEFORM JOIT. LEFT FOOT  [FreeTextEntry8] : DORSAL EXOSTOSIS FIRST MET-CUNIEFORM JOIT. LEFT FOOT

## 2023-08-14 ENCOUNTER — APPOINTMENT (OUTPATIENT)
Dept: ORTHOPEDIC SURGERY | Facility: CLINIC | Age: 68
End: 2023-08-14
Payer: COMMERCIAL

## 2023-08-14 PROCEDURE — 99213 OFFICE O/P EST LOW 20 MIN: CPT | Mod: 25

## 2023-08-14 PROCEDURE — 20600 DRAIN/INJ JOINT/BURSA W/O US: CPT | Mod: LT

## 2023-08-14 NOTE — ASSESSMENT
[FreeTextEntry1] : PROP INTRAOP AND POST OPERATIVE COURSES WERE DISCUSSED. RADIOGRAPHS WERE DISCUSSED AS WELL AS THE PROCEDURE.  NO GUARANTEE AS TO A RESULT OR CURE. MOST LIKELY DO NOT NEED CRUTCHES OR TO BE OFF WEIGHT. TOLD SHE MUST KEEP IT DRY AND CLEAN. PROPOSED SURGERY: EXOSTECTOMY DORSAL MET-CUNIEFORM LEFT FOOT  ANESTHESIA WAS DISCUSSED IV VS GA.  POST OPERATIVE MEDICATIONS WERE DISCUSSED SURGERY WILL BE OUTPATIENT   ALL RISKS AND COMPLICATIONS WERE DISCUSSED TODAY WITH THE PATIENT-INCLUDES PAIN, INFECTION, NUMBNESS, LOSS OF FOOT OR LIMB, DIFFICULTY WITH SHOES AND WALKING, PAINFUL SCAR, RESIDUAL SWELLING, DIFFICULTY WEARING SHOES, DIFFICULTY WITH PLAYING ATHLETICS AS WELL AS DEATH FROM BLOOD CLOTS AND ANESTHESIA.  CELESTONE 6 MG APPLIED WITH LIDOCAINE PLAIN 10 MG  TO REDUCE PAIN AND SWELLING CELESTONE 2 UNITS, LIDOCAINE PLAIN 2 UNITS LEFT DORSAL FOOT  NSAID OF CHOICE FOR PAIN. TYLENOL FOR PAIN COLD COMPRESSES.

## 2023-08-14 NOTE — HISTORY OF PRESENT ILLNESS
[de-identified] : Patient presents for follow up on LT foot. Lots of swelling again, trouble sleeping and wearing shoes is difficult. Wants to talk about surgery.

## 2023-08-14 NOTE — PHYSICAL EXAM
[Left] : left foot and ankle [1st] : 1st [2nd] : 2nd [2+] : posterior tibialis pulse: 2+ [3rd] : 3rd [5___] : [facility] 5[unfilled]/5 [Normal] : saphenous nerve sensation normal [] : non-antalgic [FreeTextEntry3] : DORSAL EXOSTOSIS FIRST MET-CUNIEFORM JOIT. LEFT FOOT  [FreeTextEntry8] : DORSAL EXOSTOSIS FIRST MET-CUNIEFORM JOIT. LEFT FOOT

## 2023-10-05 ENCOUNTER — LABORATORY RESULT (OUTPATIENT)
Age: 68
End: 2023-10-05

## 2023-10-16 NOTE — ED STATDOCS - CONSTITUTIONAL DISTRESS, MLM
"Plan:  Welcome to your continuation of weight loss focus. We'll aim to increase awareness of protein rich meals through the day to allow for 25-30grams of lean protein per meal and dosed every 4.5-6 hours to stay just ahead of hunger.  Hydrate well to reduce risks of stones and continue good fat burning, aiming for 80-100oz/day of water.    2. You can continue with your phentermine/topamax if tolerated well. We'll add a dose of naltrexone: start half a tablet with supper for 10 days then in doing well, increase to one full tablet with supper (50mg).  Stop naltrexone if any need for sedation/opiate pain medication. It blocks opiates.     3. Check labs today, I'll message results once all tests are back.    4.  Follow up with dietician.    5. I recommend at least 2-3 weeks of a tracking andrew to see how you feel on the 1950-2100kcal/day and 80-95 grams of lean protein recommendations when getting adequate water.  Lose It, myfitnesspal.    6. Continue closing your loops for standing. It's helpful to have 2 days or more a week of some strength work.       What makes a person succeed with dramatic and sustained weight loss?    It's being at the right point in your life where you feel the need to lose the weight, not because anyone told you so but because of a voice inside of you that says, \"I am ready for this\".  You're now at a point where you may be feeling anxious, irritable and when you look in the mirror you do not recognize the person looking back.  Your healthy self is buried somewhere in that reflexion and you want to free it again.  This is the sort of motivation that leads to success, and it comes from you.    Because the only person that can lose that extra weight is you, I like my patients to focus on the mindset of being in Weight Loss Season.  This gives you permission to make the changes necessary to be consistent with the diet/activity and behavior changes that lead to successful, healthy weight loss.  " "Nearly any diet plan can work for weight loss, but keeping it healthy and nutrient based to prevent deficiencies/hair loss/fatigue or irritability is vital.  If you have a plan you want to try, we'll work with you to make sure no adjustments are needed to keep you healthy through your weight loss season and working with our Bariatric Dieticians you'll be given expert guidance to customize your diet plan to suit your particular needs. If you don't have your own ideas in mind, we are always happy to suggest well researched and validated plans that provide enough food to prevent hunger but still tap into your excess fat reserves and lose weight in a sustainable fashion.  There is great evidence that lean protein/healthy fat intake with good fiber intake while minimizing simple starches/carbs produces reliable/sustainable weight loss in most people. But some feel more connected to an intermittent fasting/fast mimicking or ketogenic diet.  These protocols can be hard for many to stick with and that's why we prefer the protein/healthy fat focused diets but if these alternative strategies appeal to you, we can work with you to optimize your knowledge and results with these tools.    Losing weight is a temporary commitment, but you need to be \"All In\" to have a good weight loss season.  To avoid frustration, you have to be willing to be on track 19 out of 20 days or even better than that. But, weight loss season is generally only 4-8 months in length. After that length of time, it can be hard to maintain a negative calorie balance and our brain, motivations and metabolism will usually bring you to a plateau that cannot be broken in this modern world where other commitments start to take priority. That's when we look to stabilize the weight loss you've achieved.  If you've reached your goal by that time, fantastic, and job well done.  If there is more to go, then after a few months of stabilization, we can usually attack " "that previous plateau and break into new territory.    Because of this time limitation, we want to really get to work right away and get into a sustainable routine ASAP.  One of the best predictors of how much weight you're going to lose throughout the season is how much you lose in the first 6 weeks, so prepare well and jump in with both feet.      Occasionally, people may feel like they cannot commit fully to the changes necessary and may want to change one thing at a time and \"get used to\" the idea of losing weight.  That is OK because that is where they are in their life, and they cannot fully commit for any number of reasons.  It's part of that internal motivation and they just haven't reached PRIORTY NUMBER ONE status yet. It's possible that what they need is more time to reach that point and I am always willing to work with people that want to \"dabble\", but understand, the amount of success obtained with half measures, is much less than half results. Behavior Change cannot occur until we prepare our minds, bodies and environment for what is to come, action!    As you go through your plan, look for things to keep your motivation rolling.  The most successful people have a goal or target/reward that they are working towards.  Having a reward that celebrates your new fitness, mobility and energy is the best sort because it will encourage you to do well with the weight maintenance phase and long term lifestyle changes that promotes keeping the weight off for the long term.  Usually, \"getting healthier\" or improving blood tests or losing weight so your clothes fit better is not as internally motivating as having a tangible reward.  A good weight loss season reward is one that isn't food based, is affordable, but something special:  Something you won't be getting/doing unless you achieve your goal.   It s important to keep to the rule of success:  in order to get the reward, your goal MUST be achieved. Write this " reward down, where you can look at it daily and keep it in the front of your mind as you go through your weight loss season and it will help keep you on track.    Tools that help change behavior are vital for success. The most studied and most supported tool for weight loss is nothing more than writing down your food and weight every day.  Every Day.  Accurately and completely.  When you commit to weight loss season, this information tells you whether you're getting ENOUGH food to fuel your weight loss properly as well as teaches you the interaction between different foods you eat and how your body responds with weight loss.  You'll see that sometimes after a heavy workout you don't see the scale move until 2-3 days later.  How saltier meals (chili for example) may make you retain water for 4-5 days before you see the weight come off, you'll get used to the mini-plateaus that develop after a good 3-8 lb drop in weight as well as how you break through if you keep working the diet as you should.    Weight loss is not a linear process, there are mini ups and downs.  Learning how your body loses weight and getting comfortable with that is very rewarding. The act of writing words on paper also solidifies your will power and commitment to the season of weight loss and that by itself changes your brain chemistry/appetite, motivation and prepares you for maximal success.     Behavior change is all about getting into a new routine.  The old habits and routine have to change because without changing the circumstances of how you gained your weight, it's unlikely you'll enjoy satisfying results. If you have snacking habits, like every time you walk through the kitchen you grab a little something, well, that habit has to change and be replaced by a new habit.  It can be something as simple as keeping a doodle pad on the counter that you make a few scribbles and then walk through the kitchen having not opened the cupboard, or  "starting with a glass of water and leaving the kitchen without anything else, or checking your food journal to see how many calories you have left for the day.  Boredom is the enemy as are the old habits. Break new ground and try to push those old habits into a deep hole.  There are apps/counseling options available that can help with some of the day to day urges/behaviors if you're struggling. One commercial product that does a good job is Noom.  Unfortunately, there is a subscription fee.    Finally, exercise always helps.  While not mandatory to lose weight, every little bit helps and exercise has so many other benefits that to not work it into your plan is to miss out on all the mood, sleep, stress and general health benefits that come from making yourself a little short of breath and sweaty at least 3-4 days out of the week.  The metabolism and calorie burn benefits aside, almost every chronic ailment in medicine gets better with proper, aerobic exercise.  Allow yourself to start slow and let your body prepare itself to accept harder training 4-6 weeks down the road, but start now and commit to a plan.  Whether you have the means to hire a , join a gym or just walk out your front door or go down to your basement for a video workout, get into a exercise routine and  after 3 weeks of at least 3 times a week exercise you should be at a point where you can slowly start ramping up 10% each week to our goal of at least 150-300minutes weekly of aerobic exercise and at least twice weekly resistance training/strenghtening with weights/bands or body weight exercises.     I am a big fan of modifying the free training plan, \"Couch to 5k\", for almost all of my patients. Just type it into Gl[employer] or look it up on your smart phone andrew store.  To modify the plan,  you can use the training plan for whatever aerobic activity you do (bike/treadmill/elliptical/rower/pool/etc). During the \"jog\" intervals, you just move a " "little faster or harder or increase the tension or incline.  You use those little intervals to switch up the workout and recruit more muscles and pump the blood a little more and then recover again in the \"walk\" intervals by slowing down, decreasing the incline or turning down the tension.  3-4 days a week is not that much to ask and the benefits are enormous.  Start slow and develop the base from which you can then build on and reduce the risk of injury.  It's much more important 2-4 months from now to be enjoying your exercise then it is to over exert yourself at the start and hurt yourself.  Starting slowly allows your body to accept the training better down the road when the exercise becomes crucial for weight maintenance.  Without exercise down the road during your maintenance phase, all this hard work you are about to put in can be undone. It usually takes about 100-300 calories a day of exercise to maintain a weight loss and our focus during weight loss season is to generate the routine/activities and hobbies that make that enjoyable/sustainable.    Thanks for taking this first and most important step in your weight loss season.  Commit to it and we will cheerlead you all the way to success.  When things get tough or off track we'll offer guidance and analysis and when you reach your goal we'll celebrate your success.  In the end, it is all about your success, your health and what you do with it.      John Orellana MD  [facility]  [phone]          LEAN PROTEIN SOURCES  Getting 20-30 grams of protein, 3 meals daily, is appropriate for most people, some need more but more than about 40 grams per meal is not useful.  General rule is drinking one ounce of water per gram of protein eaten over the course of the day:  70 grams of protein each day, drink 70 oz of water.  Protein Source Portion Calories Grams of Protein                           Nonfat, plain Greek yogurt    (10 " grams sugar or less) 3/4 cup (6 oz)  12-17   Light Yogurt (10 grams sugar or less) 3/4 cup (6 oz)  6-8   Protein Shake 1 shake 110-180 15-30   Skim/1% Milk or lactose-free milk 1 cup ( 8 oz)  8   Plain or light, flavored soymilk 1 cup  7-8   Plain or light, hemp milk 1 cup 110 6   Fat Free or 1% Cottage Cheese 1/2 cup 90 15   Part skim ricotta cheese 1/2 cup 100 14   Part skim or reduced fat cheese slices 1 ounce 65-80 8     Mozzarella String Cheese 1 80 8   Canned tuna, chicken, crab or salmon  (canned in water)  1/2 cup 100 15-20   White fish (broiled, grilled, baked) 3 ounces 100 21   [place]/Tuna (broiled, grilled, baked) 3 ounces 150-180 21   Shrimp, Scallops, Lobster, Crab 3 ounces 100 21   Pork loin, Pork Tenderloin 3 ounces 150 21   Boneless, skinless chicken /turkey breast                          (broiled, grilled, baked) 3 ounces 120 21   [place], [place], [place], and Venison 3 ounces 120 21   Lean cuts of red meat and pork (sirloin,   round, tenderloin, flank, ground 93%-96%) 3 ounces 170 21   Lean or Extra Lean Ground Turkey 1/2 cup 150 20   90-95% Lean [place] Burger 1 gisselle 140-180 21   Low-fat casserole with lean meat 3/4 cup 200 17   Luncheon Meats                                                        (turkey, lean ham, roast beef, chicken) 3 ounces 100 21   Egg (boiled, poached, scrambled) 1 Egg 60 7   Egg Substitute 1/2 cup 70 10   Nuts (limit to 1 serving per day)  3 Tbsp. 150 7   Nut Rives (peanut, almond)  Limit to 1 serving or less daily 1 Tbsp. 90 4   Soy Burger (varies) 1  15   Garbanzo, Black, [place] Beans 1/2 cup 110 7   Refried Beans 1/2 cup 100 7   Kidney and Lima beans 1/2 cup 110 7   Tempeh 3 oz 175 18   Vegan crumbles 1/2 cup 100 14   Tofu 1/2 cup 110 14   Chili (beans and extra lean beef or turkey) 1 cup 200 23   Lentil Stew/Soup 1 cup 150 12   Black Bean Soup 1 cup 175 12           On-the-Go Breakfast Ideas  As of 2015, the latest research shows what a huge  impact eating breakfast has on losing weight and feeling your best. People lose more weight when they make breakfast their biggest meal of the day compared to Dinner, but even if you cannot go to that degree, getting a breakfast that has at least 20 grams of protein and even a moderate amount of fat is ideal for maintaining good energy through the day and limits overeating in the evening hours.  The following are some quick and easy suggestions for at least getting something of substance into your body in the morning.  Enjoy!    Eating breakfast within 90 minutes of waking up is an important part of taking care of your body on a restricted calorie diet plan.  After sleeping for hours, your body is in need of fuel.  An ideal breakfast is a combination of protein, whole-grain carbohydrates, or fruit.  Here s why:    -Protein digests very slowly in the body, helping you feel more satisfied.  -Whole grains provide dietary fiber, which also digests slowly and helps keep your gut clean.  -Fruit is a great source of vitamins, minerals, and fiber.     Each one of these breakfast combinations has between 200-300 calories and 15-20 grams of protein.  Feel free to mix and match!    Bone Broth (chicken bone broth or beef bone broth) is a great way to boost protein content. 8oz of bone broth will typically have 9-12grams of protein for 40kcal of energy.    Protein: Choose  -1/2 cup low-fat cottage cheese  -2 hard boiled eggs , or one cooked in olive oil (low/slow heat).  -1 low fat string cheese stick  -1 Tablespoon natural peanut butter  -Appies vegetarian sausage gisselle (found in freezer section)  -1 slice lowfat cheese  -6 oz 2% or lowfat Greek yogurt, such as Fage or Oikos.    PLUS    Whole Grains:  Choose   -1 whole wheat English muffin  -1 whole wheat harry, half  -1/2 Fiber One frozen muffin, thawed  -1/2 Fiber One toaster pastry  -1 whole wheat bagel thin  -1/2 cup Kashi cereal  -1 Kashi waffle (or other whole  grain high-fiber waffle)  Aim for whole grain/sprouted breads with at least 3g of fiber/slice if having bread. [place] is one such brand.    OR    Fruit: Choose  -1/3 cup blueberries  -1/2 banana (or a plantain- similar to a banana, yet smaller)  -1/2 cup cantaloupe cubes  -1 small apple  -1 small orange  -1/2 cup strawberries  -handful raspberries/blackberries (each berry is about 1 calorie).    *Adapted from Diabetes Living, Fall 20    Ten Breakfasts Under 250 calories    Ideally, getting between 350-600 calories  (depending on starting height and weight)for breakfast is ideal for avoiding hunger later in the day, adjust/add to the following accordingly:    One- 250 calories, 8.5 g protein  1 slice whole-grain toast   1 Tbsp peanut butter    banana    Two- 250 calories, 8 g protein    cup nonfat/lowfat yogurt  1/3rd cup diced no-sugar peaches  1/3rd cup cereal (like Special K, Cheerios, or bran flakes)    Three- 250 calories, 25 g protein  1 egg scrambled with 1 oz skim milk    cup shredded cheddar    whole grain English muffin  1 oz [place] orantes  1 tsp margarine spread    Four- 225 calories, 25 g protein  1/2 cup Kashi Go-Lean cereal    cup skim milk mixed with 1 scoop Bariatric Advantage protein powder    cup no-sugar diced pears    Five- 250 calories, 20 g protein    cup oatmeal prepared with skim milk, 1 scoop protein powder, and sugar-free maple syrup    Six- 200 calories, 5 g protein  1 whole grain waffle, toasted  1 tablespoon creamy peanut or almond butter    Seven-  250 calories, 19 g protein  Breakfast sandwich: 1 slice whole grain toast, cut in half.  Add 1 scrambled egg and one slice cheddar  cheese.    Eight-  250 calories, 15 g protein  2 eggs scrambled with 1/3 cup frozen spinach (heat before adding to eggs) and 2 tablespoons low fat cream cheese.    Nine-  150 calories, 15 g protein  2/3rd cup cottage cheese    cup cantaloupe    Ten- 200 calories, 20 g protein  Fruit smoothie made with 4 oz.  nonfat Greek yogurt,   cup berries, 1 scoop protein powder, and 4 oz skim milk.    Ten Lunches Under 250 Calories    Aim for lunch to be around 300-400 calories a day when trying to lose weight and get that protein in!    One- 200 calories, 11 g protein  1/3 cup tuna salad made with light king on 1 slice whole grain bread  1 small peeled apple    Two- 250 calories, 16 g protein  1/3 cup lowfat cottage cheese    cup cooked green beans    small fruit cocktail (in natural juice)    Three- 200 calories, 11 g protein    grilled cheese sandwich on whole grain bread with lowfat cheese  2/3rd cup of tomato soup    Four- 250 calories, 22 g protein  Deli wrap: 1 oz sliced turkey, 1 oz sliced ham, 1 oz sliced chicken rolled up with 1 slice low-fat cheese  1 small orange    Five- 250 calories, 28 g protein  2/3rd cup chili with 1 oz shredded cheese  4 saltine crackers    Six- 250 calories, 22 g protein  1 cup fresh spinach with 2 oz chicken, 1/3rd cup mandarin oranges, and 2 tablespoons sliced almonds with 1 tablespoon  vinaigrette dressing    Seven- 200 calories, 11 g protein  1 Tbsp sugar-free preserves and 1 Tbsp peanut butter on 1 slice whole grain toast    cup nonfat/lowfat Greek yogurt    Eight- 250 calories, 18 g protein  1 small soft-shell chicken taco with 1 oz shredded cheese, lettuce, tomato, salsa, and 1 Tbsp light sour cream    cup black beans    Nine- 225 calories, 13 g protein  2 ounces baked chicken  1/4 cup mashed potatoes    cup green beans    Ten- 200 calories, 21 g protein  Deli harry: 2 oz roast beef or other deli meat with 1 tsp [place] mayonnaise and sliced tomato, onion, and lettuce  1/3rd cup cottage cheese      Ten Dinners Under 300 calories    If you're eating a large breakfast and medium lunch, keep dinner small.  300-400 calories is ideal for most people depending on their caloric needs.    One- 300 calories, 12 g protein  1-inch thick slice of turkey meatloaf    cup baked butternut squash    Two- 200  calories, 9 g protein  Bread-less BLT: 3 slices turkey orantes, sliced tomato, wrapped in a large lettuce leaf    cup peeled fruit    Three- 275 calories, 36 g protein  3 oz roasted chicken    cup cooked broccoli    cup shredded cheddar cheese    cup unsweetened applesauce    Four- 200 calories, 25 g protein  3 oz baked tilapia  1/3rd cup cooked carrots    cup yogurt    Five- 250 calories, 20 g protein  Grilled ham  n  Swiss: spread 2 tsp ghee or butter on 1 slice of whole grain bread.  Cut bread in half, layer 2 oz deli ham with 1 piece of Swiss cheese and grill until cheese is melted.    cup cooked vegetables    Six- 250 calories, 18 g protein  Vegetarian cheeseburger: 1 [place] cheeseburger topped with lettuce, onion, tomato, and ketchup/mustard    cup sweet potato fries    Seven- 250 calories, 18 g protein  Pork pot roast: 2 oz roasted pork loin, 1/3rd cup roasted carrots,   medium potato, cooked with   cup gravy    Eight- 330 calories, 25 g protein  2 oz meatballs (about 2 small meatballs)    cup spaghetti sauce  1/2 piece toast topped with 1 tsp ghee or butterand topped with garlic powder, toasted in oven    Nine- 250 calories, 16 g protein  Mexican pizza: one 8  corn tortilla topped with 2 oz chicken,   cup salsa, 2 tablespoons black beans, 2 tablespoons shredded cheese.  Bake until cheese is melted.    Ten- 250 calories, 22 g protein  Shrimp stir-duke: 3 oz cooked shrimp, 1/6th onion,   pepper,   cup chopped carrots sautéed in 1 tablespoon olive oil, topped with 2 tablespoons stir duke sauce and a pinch of sesame seeds        150 Calories or Less Snack Ideas   1 hardboiled egg with   cup berries  1 small apple with 1 hardboiled egg  10 almonds with   cup berries  2 clementines with 1 light string cheese  1 light string cheese with   sliced apple  1 light string cheese wrapped in 2 slices of turkey  4 100% whole wheat crackers (e.g. Triscuit) with 1 light string cheese    c. cottage cheese with   cup fruit and 1  Tbsp sunflower seeds     cup cottage cheese with   of an avocado     can tuna fish with 1 cup sliced cucumbers     cup roasted garbanzo beans with paprika and cayenne pepper    baked sweet potato with   cup chili beans or   cup cottage cheese  2 oz. nitrate free turkey slices with 1 cup carrots  1 container (6 oz) of low sugar (less than 10 grams of sugar) greek yogurt   3 Tablespoons of hummus with 1 cup sliced bell peppers   2 Tablespoons of hummus with 15 baby carrots  4 Tablespoons ranch dip made with plain Greek Yogurt and 3 mini cucumbers  1/4 cup nuts (any kind)  1 Tablespoon peanut butter with 1 stalk celery   1 dill pickle wrapped in 1-2 slices of deli ham with 1 tsp of mayonnaise/mustard.        Information about the Weight Loss Medication Phentermine    When combined with mindful eating and behavior changes, weight loss medications can be a nice additional tool to maximize your weight loss season.  There are no magic pills and without diet and behavior changes, weight loss will be minimal.  Think of this medication as a tool to make your diet and behavior changes easier and you'll enjoy a higher probability of success.  Remember not to skip meals, but use this medication to tolerate your reduced calories more easily.  If you are very hungry in the evenings, you are likely not eating enough in the first 10 hours of your day and need to focus on getting your protein requirements in at each of your 3 daily meals.      Phentermine is a stimulant medication related to the amphetamine class of medication but with a lower risk of dependence and addiction.  It is used for weight loss by suppressing the appetite region of the brain.  It also may speed up the metabolic rate and give a person more energy.  Like any medication there are potential side effects and the most common are:  Dry mouth occurs in almost everyone (hydrate well), fewer people experience Palpatations, fast heart rate, elevation of blood  "pressure, restlessness, insomnia, dizziness, change in mood, tremor, headache, changes in bowel movements,itchiness, changes in sex drive.  If you are or may have become pregnant, do not use phentermine as it increases the risk for birth defects/miscarriage.  Do not use if breastfeeding.    Some people can develop serious side effects which include:  Heart strain (\"ischemia\").  Tachycardia (fast heart rate or irregular heart rate).  Hypertension  Pulmonary Hypertension  Psychosis  Dependency and abuse has occurred in some.  If you've been on high dose (37.5mg) for long periods, phentermine should be tapered down over a few weeks before abruptly stopping as seizures have been reported rarely.    We do not recommend taking it in combination with the following medications due to potential drug interactions which can increase the risk of side effects and/or potential for seizures:    Absolutely contraindicated are:  Amphetamines or other stimulants like ADHD medication: (dextroamphetamine, amphetamine, diethylpropion, isocarboxazid, methamphetamine, lisdexamfetamine, benzphetamine,dexmethylphenidate, methylphenidate, selegiline patch, sibutramine, tranylcypromine.    Avoid use with:   Dopamine, dobutamine, ephedra, ephedrine, epinephrine, isoproterenol, linezolid, norepinephrine, phenylephrine injection, venlafaxine (Effexor).    Monitor or modify dose with:  Acebutolol, atenolol, betaxolol, bisoprolol, carvedilol, droxidopa, esmolol, labetalol, magnesium citrate, metoprolol, nadolol, nebivolol, penbutolol, pindolol, propranolol, sotalol, timolol.    Caution with: armodafinil,betaxolol eye drops, brexpiprazole, bupropion, busulfan, caffeine, carteolol drops, enzalutaminde, ginseng, green tea, guarana, levobunolol drops, lindane cream, modafinil, afrin nasal spray (oxymetazoline), pamabrom, phenylephrine oral and nasal spray, pseudoephedrine (sudafed), rasgiline, sleegiline, bowel prep, tiagabine, timolol drops,  " "TRAMADOL due to increased risk of seizures.    The current cheapest place to fill your prescription is at [facility], [facility], [facility] or [place] pharmacy,[facility] [facility] [employer] or [employer] and is around $22for 90 tablets.  Occasionally, [employer] and [employer] have price matched, so call around and get the best price for you.  Other pharmacies may charge closer to$70- $100 for the same prescription. You don't have to be a member to use the pharmacy at [facility] currently.  An alternative some patients have tried is using a voucher system through [facility].  $25 paid on their website gets you a  voucher that can allows you to pick your meds up at [facility] without paying anything more.  [employer] may also offer discounted coupons and give prices around you.    Dosing:  We start with half a tablet for the first 2-3 weeks and if tolerating it without problems, you can take up to one full tablet daily in the morning after breakfast.  For those with evening hunger problems, sometimes half a tablet in the morning and half a tablet around 1 pm can be effective, however, risks of nighttime insomnia/restless increase with afternoon dosing so call me at the clinic if considering this regimen or having any issues.  You only have to use the amount effective for you, not to exceed one full tablet.  It can also be used situationaly and does not have to be taken every day. For more sensitive individuals,: get a pill cutter and cut the half tab into quarters and use a quarter of a tablet, about 9mg, for a couple weeks before increasing to half a tablet (18.75mg) if needed.  As always, if any questions give us a call at the [facility] telephone:  [phone].     Don't use Phentermine if sick/ill or using other stimulants/cold medications and it's OK to skip days that you don't feel the need for appetite suppressant assistance.  This medication works the day you take it and doesn't require \"building up\" in the " system.  Topiramate for weight loss:    Topiramate (Topamax) is used to prevent seizures and migraines. Although it's not currently FDA approved for weight loss, it has been used safely for a number of years to help people lose weight.    It seems to work on areas of the brain to quiet signals related to eating and decreases appetite and cravings.    Topiramate may make some people feel:  -less interested in eating between meals.  -think less about food/eating.  -easier to push the plate away.  -giving up soda pop easier (generally makes it taste bad).  -have more control with portions.    How to take it:    1. Start at bedtime: one tablet, 25mg, nightly for a week.  2. Increase to 2 tablets (50mg) week 2 each night.  3. Increase to 3 tablets (75mg) the 3rd week and stay on this dose until follow up if tolerated. Back off to next lower dose if not tolerating it. Discontinue if rash/mood swing/dizziness or if feeling unwell.    *Don't stop taking it if you don't think it's doing anything. It's effect can be subtle for some.    *If prolonged use for months, we recommend tapering down over several weeks to reduce the risk of withdrawal/seizures.    Do not take with sleeping pills.    Higher doses are associated with more sedation/confusion/forgetfulness so we try to limit those side effects by keeping the dose to 75mg twice daily maximum in most cases.    Potential Side Effects:  The most common side effects are:  -Tingling in the hands/feet or face.  -change in concentration.  -attention difficulty  -depression.  -increased body temperature.  -decreased sweating.  -increase risk of kidney stones.  -feeling sleepy/sedated tends to improve after a short time of use.    How to Store Topiramate (Topamax):  -Store in closed container at room temp away from heat/moisture.  -Keep out of the reach of children and pets.      When should I call my medical weight management team?  Call right away if yo notice any of the  followin. Memory/speech problems.  2. Confusion/word finding difficulty (about 10% risk).   3. Change in vision as some glaucoma sx may worsen.  4. Nausea/vomiting feeling unwell for unclear reasons.    Contact call center if questions:  [phone].    What should I know before taking this medication?    1. Topiramate works best when you help it work:   -Decrease temptations around the house.   -stay away from situations that may trigger you.  Bupropion/Naltrexone Patient Information (trade name, CONTRAVE)    This medication is used for weight loss.  In studies people lost an averaged 5-8% of their starting weight compared to placebo (0-1%).    Often, this medication will be written as 2 separate prescriptions to improve cost to the patient. The trade name drug CONTRAVE comes as a combination of 8mg naltrexone/90mg bupropion and a 3 week escalating dose regimen going from one tablet daily up to a max of 2 tabs twice daily:  Week 1: one tablet in the daytime.  Week 2: one tablet A.M.  And One tablet in the PM.  Week 3: 2 tabs AM  And One tablet in the PM.  Week 4: 2 tabs AM and 2 tabs PM.     The generic Rx I write for is as follows:  I recommend starting One 75 mg bupropion and 1/2 a tablet of naltrexone (25mg) daily for 10 days and then moving up to One 75 mg bupropion tablet twice daily and half a naltrexone tablet twice daily.  Depending on your results/tolerance, we may increase the Bupropion up to a maximum of 150 mg twice daily of the immediate release medication or one Bupropion  mg-300 mg daily. Sometimes we'll have to go slower with the dose escalation.    Like any weight loss medication, showing results and having tolerable or no side effects is vital to continuing therapy and if either no significant weight loss after 8-12 weeks or too many side effects then we would discontinue therapy and look for alternative options/assistance.    AVOID taking with high fat meals as this increases  bupropion levels, but some food in the stomach can help decrease nausea side effects from the Naltrexone.    People who take Metoprolol may need to decrease their dose of metoprolol as the bupropion increases the effect of metoprolol 2-4 fold in some cases and low blood pressure/low heart rate could occur.    Patients should STOP CONTRAVE if they have a severe allergic reaction to CONTRAVE.  Patients should be told that CONTRAVE should be discontinued and not restarted if they experience a seizure while on treatment.    Patients should be advised that the excessive use or abrupt discontinuation of alcohol,benzodiazepines, antiepileptic drugs, or sedatives/hypnotics can increase the risk of seizure.    Patients should be advised to minimize or avoid use of alcohol.    Patients should be advised that if they previously used opioids, they may be more sensitive to lower doses of opioids and at risk of accidental overdose should they use opioids after CONTRAVE treatment is discontinued or temporarily interrupted.  Patients should be advised that because naltrexone, a component of CONTRAVE, can block the effects of opioids, they will not perceive any effect if they attempt to self-administer anyopioid drug in small doses while on CONTRAVE. Further advise patients that the attempt to administer large doses of any opioid or to bypass the blockade while on CONTRAVE may lead to serious injury, coma, or death.    Patients should be off all opioids for a minimum of 7 to 10 days before starting CONTRAVE in order to avoid precipitation of withdrawal. Advise patients they should not take CONTRAVE if they have any symptoms of opioid withdrawal.   Patients should be advised to call their healthcare provider if they experience increased blood pressure or heart rate.  Patients should be advised to notify their healthcare provider if they are taking, or plan to take, any prescription or over-the-counter drugs. Concern is warranted  because CONTRAVE and other drugs may affect each other s metabolism.  Patients should be advised to notify their healthcare provider if they become pregnant, intend to become pregnant, or are breastfeeding during therapy.  CONTRAVE should NOT be taken if pregnant or nursing.    Patients with type 2 diabetes mellitus on antidiabetic therapy should be advised to monitor their blood glucose levels and report symptoms of hypoglycemia to their healthcare provider(s).    Patients should be advised to swallow CONTRAVE tablets whole so that the release rate is not altered. Do not chew, divide, or crush tablets if using the Trade drug Contrave, dividing the generic naltrexone is fine.    As always, if any questions/concerns, don't hesitate to call us at the [facility] clinic.    John Orellana MD  [phone].  10/16/2023       no apparent

## 2023-10-19 RX ORDER — OXYCODONE AND ACETAMINOPHEN 5; 325 MG/1; MG/1
5-325 TABLET ORAL
Qty: 20 | Refills: 0 | Status: ACTIVE | COMMUNITY
Start: 2023-10-19 | End: 1900-01-01

## 2023-10-19 RX ORDER — KETOROLAC TROMETHAMINE 10 MG/1
10 TABLET, FILM COATED ORAL EVERY 6 HOURS
Qty: 20 | Refills: 0 | Status: ACTIVE | COMMUNITY
Start: 2023-10-19 | End: 1900-01-01

## 2023-10-23 ENCOUNTER — APPOINTMENT (OUTPATIENT)
Dept: ORTHOPEDIC SURGERY | Facility: AMBULATORY SURGERY CENTER | Age: 68
End: 2023-10-23
Payer: COMMERCIAL

## 2023-10-23 PROCEDURE — 28289 CORRJ HALUX RIGDUS W/O IMPLT: CPT | Mod: LT

## 2023-11-03 ENCOUNTER — APPOINTMENT (OUTPATIENT)
Dept: ORTHOPEDIC SURGERY | Facility: CLINIC | Age: 68
End: 2023-11-03
Payer: COMMERCIAL

## 2023-11-03 PROCEDURE — 99024 POSTOP FOLLOW-UP VISIT: CPT

## 2023-11-17 ENCOUNTER — APPOINTMENT (OUTPATIENT)
Dept: ORTHOPEDIC SURGERY | Facility: CLINIC | Age: 68
End: 2023-11-17
Payer: COMMERCIAL

## 2023-11-17 PROCEDURE — 99024 POSTOP FOLLOW-UP VISIT: CPT

## 2023-11-28 ENCOUNTER — APPOINTMENT (OUTPATIENT)
Dept: ORTHOPEDIC SURGERY | Facility: CLINIC | Age: 68
End: 2023-11-28
Payer: COMMERCIAL

## 2023-11-28 PROCEDURE — 99024 POSTOP FOLLOW-UP VISIT: CPT

## 2023-12-04 ENCOUNTER — APPOINTMENT (OUTPATIENT)
Dept: ORTHOPEDIC SURGERY | Facility: CLINIC | Age: 68
End: 2023-12-04
Payer: COMMERCIAL

## 2023-12-04 DIAGNOSIS — M89.8X7 OTHER SPECIFIED DISORDERS OF BONE, ANKLE AND FOOT: ICD-10-CM

## 2023-12-04 PROCEDURE — 99024 POSTOP FOLLOW-UP VISIT: CPT

## 2024-01-05 ENCOUNTER — APPOINTMENT (OUTPATIENT)
Dept: ORTHOPEDIC SURGERY | Facility: CLINIC | Age: 69
End: 2024-01-05
Payer: COMMERCIAL

## 2024-01-05 ENCOUNTER — APPOINTMENT (OUTPATIENT)
Dept: ORTHOPEDIC SURGERY | Facility: CLINIC | Age: 69
End: 2024-01-05

## 2024-01-05 DIAGNOSIS — M19.072 PRIMARY OSTEOARTHRITIS, LEFT ANKLE AND FOOT: ICD-10-CM

## 2024-01-05 PROCEDURE — 99024 POSTOP FOLLOW-UP VISIT: CPT

## 2024-01-05 PROCEDURE — 73630 X-RAY EXAM OF FOOT: CPT | Mod: LT

## 2024-01-05 NOTE — PHYSICAL EXAM
[Mild] : mild swelling of dorsal foot [5___] : plantar flexion 5[unfilled]/5 [2+] : posterior tibialis pulse: 2+ [] : patient ambulates without assistive device [Left] : left foot [There are no fractures, subluxations or dislocations. No significant abnormalities are seen] : There are no fractures, subluxations or dislocations. No significant abnormalities are seen

## 2024-01-05 NOTE — DISCUSSION/SUMMARY
[de-identified] : I reviewed patient's radiographs and discussed her condition and treatment course.  Continue WBAT and wearing regular shoes as tolerated.  Recommended scar desensitization and massage.  Patient voiced understanding and agreement with the plan.

## 2024-01-05 NOTE — HISTORY OF PRESENT ILLNESS
[de-identified] : Patient of Dr. Choi. Patient presents for LT foot post op visit DOS 10/23/23. Patient states the pain is less at night but is still having difficulty wearing tighter shoes since she states that the area around the insicion is still sensitive.

## 2024-03-12 NOTE — ED ADULT TRIAGE NOTE - DIRECT TO ROOM CARE INITIATED:
Home Care Instructions for Gastroscopy with Sedation    Diet:  - Resume your regular diet as tolerated unless otherwise instructed.  - Start with light meals to minimize bloating.  - Do not drink alcohol today.    Medication:  - If you have questions about resuming your normal medications, please contact your Primary Care Physician.    Activities:  - Take it easy today. Do not return to work today.  - Do not drive today.  - Do not operate any machinery today (including kitchen equipment).    Gastroscopy:  - You may have a sore throat for 2-3 days following the exam. This is normal. Gargling with warm salt water (1/2 tsp salt to 1 glass warm water) or using throat lozenges will help.  - If you experience any sharp pain in your neck, abdomen or chest, vomiting of blood, oral temperature over 100 degrees Fahrenheit, light-headedness or dizziness, or any other problems, contact your doctor.    **If unable to reach your doctor, please go to the [facility] Emergency Room**    - Your referring physician will receive a full report of your examination.  - If you do not hear from your doctor's office within two weeks of your biopsy, please call them for your results.    You may be able to see your laboratory results in [employer] between 4 and 7 business days.  In some cases, your physician may not have viewed the results before they are released to [employer].  If you have questions regarding your results contact the physician who ordered the test/exam by phone or via [employer] by choosing \"Ask a Medical Question.\"   
15-Apr-2021 08:59

## 2024-07-08 ENCOUNTER — APPOINTMENT (OUTPATIENT)
Dept: ULTRASOUND IMAGING | Facility: CLINIC | Age: 69
End: 2024-07-08
Payer: COMMERCIAL

## 2024-07-08 ENCOUNTER — OUTPATIENT (OUTPATIENT)
Dept: OUTPATIENT SERVICES | Facility: HOSPITAL | Age: 69
LOS: 1 days | End: 2024-07-08

## 2024-07-08 DIAGNOSIS — Z00.8 ENCOUNTER FOR OTHER GENERAL EXAMINATION: ICD-10-CM

## 2024-07-08 PROCEDURE — 76857 US EXAM PELVIC LIMITED: CPT | Mod: 26
